# Patient Record
Sex: FEMALE | Race: BLACK OR AFRICAN AMERICAN | NOT HISPANIC OR LATINO | Employment: OTHER | RURAL
[De-identification: names, ages, dates, MRNs, and addresses within clinical notes are randomized per-mention and may not be internally consistent; named-entity substitution may affect disease eponyms.]

---

## 2020-05-07 ENCOUNTER — HISTORICAL (OUTPATIENT)
Dept: ADMINISTRATIVE | Facility: HOSPITAL | Age: 75
End: 2020-05-07

## 2020-05-07 LAB
BACTERIA #/AREA URNS HPF: ABNORMAL /HPF
BASOPHILS # BLD AUTO: 0.08 X10E3/UL (ref 0–0.2)
BASOPHILS NFR BLD AUTO: 0.9 % (ref 0–1)
BILIRUB UR QL STRIP: NEGATIVE MG/DL
BUN SERPL-MCNC: 16 MG/DL (ref 7–18)
CALCIUM SERPL-MCNC: 9.9 MG/DL (ref 8.5–10.1)
CHLORIDE SERPL-SCNC: 105 MMOL/L (ref 98–107)
CLARITY UR: ABNORMAL
CO2 SERPL-SCNC: 27 MMOL/L (ref 21–32)
COLOR UR: YELLOW
CREAT SERPL-MCNC: 0.85 MG/DL (ref 0.5–1.02)
EOSINOPHIL # BLD AUTO: 0.2 X10E3/UL (ref 0–0.5)
EOSINOPHIL NFR BLD AUTO: 2.3 % (ref 1–4)
ERYTHROCYTE [DISTWIDTH] IN BLOOD BY AUTOMATED COUNT: 13.8 % (ref 11.5–14.5)
GLUCOSE SERPL-MCNC: 103 MG/DL (ref 74–106)
GLUCOSE UR STRIP-MCNC: NEGATIVE MG/DL
HCT VFR BLD AUTO: 43.9 % (ref 38–47)
HGB BLD-MCNC: 13.5 G/DL (ref 12–16)
IMM GRANULOCYTES # BLD AUTO: 0.02 X10E3/UL (ref 0–0.04)
IMM GRANULOCYTES NFR BLD: 0.2 % (ref 0–0.4)
KETONES UR STRIP-SCNC: NEGATIVE MG/DL
LEUKOCYTE ESTERASE UR QL STRIP: ABNORMAL LEU/UL
LYMPHOCYTES # BLD AUTO: 2.63 X10E3/UL (ref 1–4.8)
LYMPHOCYTES NFR BLD AUTO: 30.5 % (ref 27–41)
MCH RBC QN AUTO: 28.8 PG (ref 27–31)
MCHC RBC AUTO-ENTMCNC: 30.8 G/DL (ref 32–36)
MCV RBC AUTO: 93.8 FL (ref 80–96)
MONOCYTES # BLD AUTO: 0.52 X10E3/UL (ref 0–0.8)
MONOCYTES NFR BLD AUTO: 6 % (ref 2–6)
MPC BLD CALC-MCNC: 11.1 FL (ref 9.4–12.4)
MUCOUS THREADS #/AREA URNS HPF: ABNORMAL /HPF
NEUTROPHILS # BLD AUTO: 5.16 X10E3/UL (ref 1.8–7.7)
NEUTROPHILS NFR BLD AUTO: 60.1 % (ref 53–65)
NITRITE UR QL STRIP: NEGATIVE
NRBC # BLD AUTO: 0 X10E3/UL (ref 0–0)
NRBC, AUTO (.00): 0 /100 (ref 0–0)
PH UR STRIP: 5.5 PH UNITS (ref 5–8)
PLATELET # BLD AUTO: 244 X10E3/UL (ref 150–400)
POTASSIUM SERPL-SCNC: 4.1 MMOL/L (ref 3.5–5.1)
PROT UR QL STRIP: NEGATIVE MG/DL
RBC # BLD AUTO: 4.68 X10E6/UL (ref 4.2–5.4)
RBC # UR STRIP: ABNORMAL ERY/UL
RBC #/AREA URNS HPF: ABNORMAL /HPF (ref 0–3)
SODIUM SERPL-SCNC: 139 MMOL/L (ref 136–145)
SP GR UR STRIP: 1.01 (ref 1–1.03)
SQUAMOUS #/AREA URNS LPF: ABNORMAL /LPF
TRICHOMONAS #/AREA URNS HPF: ABNORMAL /HPF
UROBILINOGEN UR STRIP-ACNC: 0.2 EU/DL
WBC # BLD AUTO: 8.61 X10E3/UL (ref 4.5–11)
WBC #/AREA URNS HPF: ABNORMAL /HPF (ref 0–5)
YEAST #/AREA URNS HPF: ABNORMAL /HPF

## 2020-05-09 LAB
REPORT: NORMAL

## 2020-05-15 ENCOUNTER — HISTORICAL (OUTPATIENT)
Dept: ADMINISTRATIVE | Facility: HOSPITAL | Age: 75
End: 2020-05-15

## 2020-05-19 ENCOUNTER — HISTORICAL (OUTPATIENT)
Dept: ADMINISTRATIVE | Facility: HOSPITAL | Age: 75
End: 2020-05-19

## 2020-05-19 LAB — CREAT SERPL-MCNC: 0.86 MG/DL (ref 0.5–1.02)

## 2020-05-22 ENCOUNTER — HISTORICAL (OUTPATIENT)
Dept: ADMINISTRATIVE | Facility: HOSPITAL | Age: 75
End: 2020-05-22

## 2020-06-17 ENCOUNTER — HISTORICAL (OUTPATIENT)
Dept: ADMINISTRATIVE | Facility: HOSPITAL | Age: 75
End: 2020-06-17

## 2020-06-17 LAB — CREAT SERPL-MCNC: 1.81 MG/DL (ref 0.5–1.02)

## 2021-03-19 ENCOUNTER — OFFICE VISIT (OUTPATIENT)
Dept: FAMILY MEDICINE | Facility: CLINIC | Age: 76
End: 2021-03-19
Payer: MEDICARE

## 2021-03-19 DIAGNOSIS — Z91.09 ENVIRONMENTAL ALLERGIES: ICD-10-CM

## 2021-03-19 DIAGNOSIS — E78.5 HYPERLIPIDEMIA, UNSPECIFIED HYPERLIPIDEMIA TYPE: Primary | ICD-10-CM

## 2021-03-19 DIAGNOSIS — K21.9 GASTROESOPHAGEAL REFLUX DISEASE, UNSPECIFIED WHETHER ESOPHAGITIS PRESENT: ICD-10-CM

## 2021-03-19 DIAGNOSIS — I63.9 CEREBROVASCULAR ACCIDENT (CVA), UNSPECIFIED MECHANISM: ICD-10-CM

## 2021-03-19 DIAGNOSIS — G25.81 RESTLESS LEG: ICD-10-CM

## 2021-03-19 DIAGNOSIS — F32.A DEPRESSION, UNSPECIFIED DEPRESSION TYPE: ICD-10-CM

## 2021-03-19 DIAGNOSIS — I10 HYPERTENSION, UNSPECIFIED TYPE: ICD-10-CM

## 2021-03-19 PROCEDURE — 99213 PR OFFICE/OUTPT VISIT, EST, LEVL III, 20-29 MIN: ICD-10-PCS | Mod: ,,, | Performed by: NURSE PRACTITIONER

## 2021-03-19 PROCEDURE — 99213 OFFICE O/P EST LOW 20 MIN: CPT | Mod: ,,, | Performed by: NURSE PRACTITIONER

## 2021-03-19 RX ORDER — AMLODIPINE BESYLATE 5 MG/1
5 TABLET ORAL DAILY
Qty: 90 TABLET | Refills: 0 | Status: SHIPPED | OUTPATIENT
Start: 2021-03-19 | End: 2021-06-23 | Stop reason: SDUPTHER

## 2021-03-19 RX ORDER — GABAPENTIN 100 MG/1
CAPSULE ORAL
COMMUNITY
Start: 2021-02-04 | End: 2021-03-19 | Stop reason: SDUPTHER

## 2021-03-19 RX ORDER — PANTOPRAZOLE SODIUM 40 MG/1
40 TABLET, DELAYED RELEASE ORAL DAILY
Qty: 90 TABLET | Refills: 0 | Status: SHIPPED | OUTPATIENT
Start: 2021-03-19 | End: 2021-06-23 | Stop reason: SDUPTHER

## 2021-03-19 RX ORDER — MONTELUKAST SODIUM 10 MG/1
10 TABLET ORAL DAILY
Qty: 90 TABLET | Refills: 0 | Status: SHIPPED | OUTPATIENT
Start: 2021-03-19 | End: 2021-06-23 | Stop reason: SDUPTHER

## 2021-03-19 RX ORDER — GABAPENTIN 100 MG/1
100 CAPSULE ORAL NIGHTLY
Qty: 90 CAPSULE | Refills: 0 | Status: SHIPPED | OUTPATIENT
Start: 2021-03-19 | End: 2021-06-23 | Stop reason: SDUPTHER

## 2021-03-19 RX ORDER — ROSUVASTATIN CALCIUM 40 MG/1
10 TABLET, COATED ORAL NIGHTLY
COMMUNITY
End: 2021-03-19 | Stop reason: SDUPTHER

## 2021-03-19 RX ORDER — BUSPIRONE HYDROCHLORIDE 15 MG/1
15 TABLET ORAL DAILY
COMMUNITY
Start: 2021-03-10 | End: 2021-03-19 | Stop reason: SDUPTHER

## 2021-03-19 RX ORDER — ESCITALOPRAM OXALATE 20 MG/1
20 TABLET ORAL DAILY
COMMUNITY
Start: 2021-01-06 | End: 2021-03-19 | Stop reason: SDUPTHER

## 2021-03-19 RX ORDER — METOPROLOL TARTRATE 25 MG/1
12.5 TABLET, FILM COATED ORAL 2 TIMES DAILY
COMMUNITY
Start: 2021-02-04 | End: 2021-03-19 | Stop reason: SDUPTHER

## 2021-03-19 RX ORDER — ROSUVASTATIN CALCIUM 40 MG/1
40 TABLET, COATED ORAL NIGHTLY
Qty: 90 TABLET | Refills: 0 | Status: SHIPPED | OUTPATIENT
Start: 2021-03-19 | End: 2021-06-23 | Stop reason: SDUPTHER

## 2021-03-19 RX ORDER — LISINOPRIL 40 MG/1
40 TABLET ORAL DAILY
Qty: 90 TABLET | Refills: 0 | Status: SHIPPED | OUTPATIENT
Start: 2021-03-19 | End: 2021-06-23 | Stop reason: SDUPTHER

## 2021-03-19 RX ORDER — MONTELUKAST SODIUM 10 MG/1
10 TABLET ORAL DAILY
COMMUNITY
Start: 2021-01-06 | End: 2021-03-19 | Stop reason: SDUPTHER

## 2021-03-19 RX ORDER — PANTOPRAZOLE SODIUM 40 MG/1
TABLET, DELAYED RELEASE ORAL
COMMUNITY
Start: 2021-02-04 | End: 2021-03-19 | Stop reason: SDUPTHER

## 2021-03-19 RX ORDER — ROPINIROLE 0.25 MG/1
0.25 TABLET, FILM COATED ORAL 3 TIMES DAILY
Qty: 180 TABLET | Refills: 0 | Status: SHIPPED | OUTPATIENT
Start: 2021-03-19 | End: 2021-06-23 | Stop reason: SDUPTHER

## 2021-03-19 RX ORDER — FLUTICASONE FUROATE, UMECLIDINIUM BROMIDE AND VILANTEROL TRIFENATATE 100; 62.5; 25 UG/1; UG/1; UG/1
POWDER RESPIRATORY (INHALATION)
COMMUNITY
Start: 2021-03-18 | End: 2021-08-02 | Stop reason: SDUPTHER

## 2021-03-19 RX ORDER — ROPINIROLE 0.25 MG/1
0.25 TABLET, FILM COATED ORAL 3 TIMES DAILY
COMMUNITY
End: 2021-03-19 | Stop reason: SDUPTHER

## 2021-03-19 RX ORDER — METOPROLOL TARTRATE 25 MG/1
12.5 TABLET, FILM COATED ORAL 2 TIMES DAILY
Qty: 90 TABLET | Refills: 0 | Status: SHIPPED | OUTPATIENT
Start: 2021-03-19 | End: 2021-06-23 | Stop reason: SDUPTHER

## 2021-03-19 RX ORDER — CLOPIDOGREL BISULFATE 75 MG/1
75 TABLET ORAL ONCE
Qty: 90 TABLET | Refills: 0 | Status: SHIPPED | OUTPATIENT
Start: 2021-03-19 | End: 2021-06-23 | Stop reason: SDUPTHER

## 2021-03-19 RX ORDER — BUSPIRONE HYDROCHLORIDE 15 MG/1
15 TABLET ORAL DAILY
Qty: 90 TABLET | Refills: 0 | Status: SHIPPED | OUTPATIENT
Start: 2021-03-19 | End: 2021-06-23 | Stop reason: SDUPTHER

## 2021-03-19 RX ORDER — ESCITALOPRAM OXALATE 20 MG/1
20 TABLET ORAL DAILY
Qty: 90 TABLET | Refills: 0 | Status: SHIPPED | OUTPATIENT
Start: 2021-03-19 | End: 2021-06-23 | Stop reason: SDUPTHER

## 2021-03-19 RX ORDER — LISINOPRIL 40 MG/1
40 TABLET ORAL DAILY
COMMUNITY
Start: 2021-02-04 | End: 2021-03-19 | Stop reason: SDUPTHER

## 2021-03-19 RX ORDER — CLOPIDOGREL 300 MG/1
300 TABLET, FILM COATED ORAL ONCE
COMMUNITY
End: 2021-03-19 | Stop reason: SDUPTHER

## 2021-03-19 RX ORDER — ISOSORBIDE MONONITRATE 30 MG/1
30 TABLET, EXTENDED RELEASE ORAL DAILY
Qty: 90 TABLET | Refills: 0 | Status: SHIPPED | OUTPATIENT
Start: 2021-03-19 | End: 2021-06-23 | Stop reason: SDUPTHER

## 2021-03-19 RX ORDER — AMLODIPINE BESYLATE 5 MG/1
5 TABLET ORAL DAILY
COMMUNITY
Start: 2021-01-20 | End: 2021-03-19 | Stop reason: SDUPTHER

## 2021-03-19 RX ORDER — ISOSORBIDE MONONITRATE 30 MG/1
30 TABLET, EXTENDED RELEASE ORAL DAILY
COMMUNITY
Start: 2021-02-04 | End: 2021-03-19 | Stop reason: SDUPTHER

## 2021-05-24 ENCOUNTER — TELEPHONE (OUTPATIENT)
Dept: FAMILY MEDICINE | Facility: CLINIC | Age: 76
End: 2021-05-24

## 2021-06-21 DIAGNOSIS — Z91.09 ENVIRONMENTAL ALLERGIES: ICD-10-CM

## 2021-06-21 DIAGNOSIS — E78.5 HYPERLIPIDEMIA, UNSPECIFIED HYPERLIPIDEMIA TYPE: ICD-10-CM

## 2021-06-21 DIAGNOSIS — I10 HYPERTENSION, UNSPECIFIED TYPE: ICD-10-CM

## 2021-06-21 DIAGNOSIS — K21.9 GASTROESOPHAGEAL REFLUX DISEASE, UNSPECIFIED WHETHER ESOPHAGITIS PRESENT: ICD-10-CM

## 2021-06-21 DIAGNOSIS — G25.81 RESTLESS LEG: ICD-10-CM

## 2021-06-21 DIAGNOSIS — F32.A DEPRESSION, UNSPECIFIED DEPRESSION TYPE: ICD-10-CM

## 2021-06-21 DIAGNOSIS — I63.9 CEREBROVASCULAR ACCIDENT (CVA), UNSPECIFIED MECHANISM: ICD-10-CM

## 2021-06-23 RX ORDER — PANTOPRAZOLE SODIUM 40 MG/1
40 TABLET, DELAYED RELEASE ORAL DAILY
Qty: 90 TABLET | Refills: 0 | Status: SHIPPED | OUTPATIENT
Start: 2021-06-23 | End: 2021-08-02 | Stop reason: SDUPTHER

## 2021-06-23 RX ORDER — ROSUVASTATIN CALCIUM 40 MG/1
40 TABLET, COATED ORAL NIGHTLY
Qty: 90 TABLET | Refills: 0 | Status: SHIPPED | OUTPATIENT
Start: 2021-06-23 | End: 2021-08-02 | Stop reason: SDUPTHER

## 2021-06-23 RX ORDER — ESCITALOPRAM OXALATE 20 MG/1
20 TABLET ORAL DAILY
Qty: 90 TABLET | Refills: 0 | Status: SHIPPED | OUTPATIENT
Start: 2021-06-23 | End: 2021-08-02 | Stop reason: SDUPTHER

## 2021-06-23 RX ORDER — AMLODIPINE BESYLATE 5 MG/1
5 TABLET ORAL DAILY
Qty: 90 TABLET | Refills: 0 | Status: SHIPPED | OUTPATIENT
Start: 2021-06-23 | End: 2021-08-02 | Stop reason: SDUPTHER

## 2021-06-23 RX ORDER — ROPINIROLE 0.25 MG/1
0.25 TABLET, FILM COATED ORAL 3 TIMES DAILY
Qty: 180 TABLET | Refills: 0 | Status: SHIPPED | OUTPATIENT
Start: 2021-06-23 | End: 2021-08-02 | Stop reason: SDUPTHER

## 2021-06-23 RX ORDER — METOPROLOL TARTRATE 25 MG/1
12.5 TABLET, FILM COATED ORAL 2 TIMES DAILY
Qty: 90 TABLET | Refills: 0 | Status: SHIPPED | OUTPATIENT
Start: 2021-06-23 | End: 2021-08-02 | Stop reason: SDUPTHER

## 2021-06-23 RX ORDER — GABAPENTIN 100 MG/1
100 CAPSULE ORAL NIGHTLY
Qty: 90 CAPSULE | Refills: 0 | Status: SHIPPED | OUTPATIENT
Start: 2021-06-23 | End: 2021-08-02 | Stop reason: SDUPTHER

## 2021-06-23 RX ORDER — LISINOPRIL 40 MG/1
40 TABLET ORAL DAILY
Qty: 90 TABLET | Refills: 0 | Status: SHIPPED | OUTPATIENT
Start: 2021-06-23 | End: 2021-08-02 | Stop reason: SDUPTHER

## 2021-06-23 RX ORDER — BUSPIRONE HYDROCHLORIDE 15 MG/1
15 TABLET ORAL DAILY
Qty: 90 TABLET | Refills: 0 | Status: SHIPPED | OUTPATIENT
Start: 2021-06-23 | End: 2021-08-02 | Stop reason: SDUPTHER

## 2021-06-23 RX ORDER — CLOPIDOGREL BISULFATE 75 MG/1
75 TABLET ORAL ONCE
Qty: 90 TABLET | Refills: 0 | Status: SHIPPED | OUTPATIENT
Start: 2021-06-23 | End: 2021-08-02 | Stop reason: SDUPTHER

## 2021-06-23 RX ORDER — ISOSORBIDE MONONITRATE 30 MG/1
30 TABLET, EXTENDED RELEASE ORAL DAILY
Qty: 90 TABLET | Refills: 0 | Status: SHIPPED | OUTPATIENT
Start: 2021-06-23 | End: 2021-08-02 | Stop reason: SDUPTHER

## 2021-06-23 RX ORDER — MONTELUKAST SODIUM 10 MG/1
10 TABLET ORAL DAILY
Qty: 90 TABLET | Refills: 0 | Status: SHIPPED | OUTPATIENT
Start: 2021-06-23 | End: 2021-08-02 | Stop reason: SDUPTHER

## 2021-08-02 ENCOUNTER — OFFICE VISIT (OUTPATIENT)
Dept: FAMILY MEDICINE | Facility: CLINIC | Age: 76
End: 2021-08-02
Payer: MEDICARE

## 2021-08-02 VITALS
SYSTOLIC BLOOD PRESSURE: 148 MMHG | RESPIRATION RATE: 17 BRPM | DIASTOLIC BLOOD PRESSURE: 90 MMHG | HEART RATE: 75 BPM | HEIGHT: 67 IN | BODY MASS INDEX: 39.24 KG/M2 | OXYGEN SATURATION: 98 % | TEMPERATURE: 99 F | WEIGHT: 250 LBS

## 2021-08-02 DIAGNOSIS — I63.9 CEREBROVASCULAR ACCIDENT (CVA), UNSPECIFIED MECHANISM: ICD-10-CM

## 2021-08-02 DIAGNOSIS — F41.9 ANXIETY: Primary | ICD-10-CM

## 2021-08-02 DIAGNOSIS — E78.5 HYPERLIPIDEMIA, UNSPECIFIED HYPERLIPIDEMIA TYPE: ICD-10-CM

## 2021-08-02 DIAGNOSIS — F32.A DEPRESSION, UNSPECIFIED DEPRESSION TYPE: ICD-10-CM

## 2021-08-02 DIAGNOSIS — F40.00 AGORAPHOBIA: ICD-10-CM

## 2021-08-02 DIAGNOSIS — G25.81 RESTLESS LEG: ICD-10-CM

## 2021-08-02 DIAGNOSIS — K21.9 GASTROESOPHAGEAL REFLUX DISEASE, UNSPECIFIED WHETHER ESOPHAGITIS PRESENT: ICD-10-CM

## 2021-08-02 DIAGNOSIS — Z91.09 ENVIRONMENTAL ALLERGIES: ICD-10-CM

## 2021-08-02 DIAGNOSIS — I10 HYPERTENSION, UNSPECIFIED TYPE: ICD-10-CM

## 2021-08-02 PROCEDURE — 99214 PR OFFICE/OUTPT VISIT, EST, LEVL IV, 30-39 MIN: ICD-10-PCS | Mod: ,,, | Performed by: NURSE PRACTITIONER

## 2021-08-02 PROCEDURE — 99214 OFFICE O/P EST MOD 30 MIN: CPT | Mod: ,,, | Performed by: NURSE PRACTITIONER

## 2021-08-02 RX ORDER — PANTOPRAZOLE SODIUM 40 MG/1
40 TABLET, DELAYED RELEASE ORAL DAILY
Qty: 90 TABLET | Refills: 1 | Status: SHIPPED | OUTPATIENT
Start: 2021-08-02 | End: 2022-06-13 | Stop reason: SDUPTHER

## 2021-08-02 RX ORDER — FLUTICASONE FUROATE, UMECLIDINIUM BROMIDE AND VILANTEROL TRIFENATATE 100; 62.5; 25 UG/1; UG/1; UG/1
1 POWDER RESPIRATORY (INHALATION) DAILY
Qty: 60 EACH | Refills: 5 | Status: SHIPPED | OUTPATIENT
Start: 2021-08-02 | End: 2022-06-13 | Stop reason: SDUPTHER

## 2021-08-02 RX ORDER — ISOSORBIDE MONONITRATE 30 MG/1
30 TABLET, EXTENDED RELEASE ORAL DAILY
Qty: 90 TABLET | Refills: 1 | Status: SHIPPED | OUTPATIENT
Start: 2021-08-02 | End: 2022-06-13 | Stop reason: SDUPTHER

## 2021-08-02 RX ORDER — METOPROLOL TARTRATE 25 MG/1
12.5 TABLET, FILM COATED ORAL 2 TIMES DAILY
Qty: 90 TABLET | Refills: 1 | Status: SHIPPED | OUTPATIENT
Start: 2021-08-02 | End: 2022-06-13 | Stop reason: SDUPTHER

## 2021-08-02 RX ORDER — GABAPENTIN 100 MG/1
100 CAPSULE ORAL NIGHTLY
Qty: 90 CAPSULE | Refills: 1 | Status: SHIPPED | OUTPATIENT
Start: 2021-08-02 | End: 2022-06-13 | Stop reason: SDUPTHER

## 2021-08-02 RX ORDER — ROSUVASTATIN CALCIUM 40 MG/1
40 TABLET, COATED ORAL NIGHTLY
Qty: 90 TABLET | Refills: 1 | Status: SHIPPED | OUTPATIENT
Start: 2021-08-02 | End: 2022-06-13 | Stop reason: SDUPTHER

## 2021-08-02 RX ORDER — CLOPIDOGREL BISULFATE 75 MG/1
75 TABLET ORAL ONCE
Qty: 90 TABLET | Refills: 1 | Status: SHIPPED | OUTPATIENT
Start: 2021-08-02 | End: 2022-06-13 | Stop reason: SDUPTHER

## 2021-08-02 RX ORDER — BUSPIRONE HYDROCHLORIDE 15 MG/1
15 TABLET ORAL DAILY
Qty: 90 TABLET | Refills: 1 | Status: SHIPPED | OUTPATIENT
Start: 2021-08-02 | End: 2022-06-13

## 2021-08-02 RX ORDER — ESCITALOPRAM OXALATE 20 MG/1
20 TABLET ORAL DAILY
Qty: 90 TABLET | Refills: 1 | Status: SHIPPED | OUTPATIENT
Start: 2021-08-02 | End: 2022-06-13 | Stop reason: SDUPTHER

## 2021-08-02 RX ORDER — MONTELUKAST SODIUM 10 MG/1
10 TABLET ORAL DAILY
Qty: 90 TABLET | Refills: 1 | Status: SHIPPED | OUTPATIENT
Start: 2021-08-02 | End: 2022-06-13 | Stop reason: SDUPTHER

## 2021-08-02 RX ORDER — ROPINIROLE 0.25 MG/1
0.25 TABLET, FILM COATED ORAL 3 TIMES DAILY
Qty: 180 TABLET | Refills: 1 | Status: SHIPPED | OUTPATIENT
Start: 2021-08-02 | End: 2022-06-13 | Stop reason: SDUPTHER

## 2021-08-02 RX ORDER — AMLODIPINE BESYLATE 5 MG/1
5 TABLET ORAL DAILY
Qty: 90 TABLET | Refills: 1 | Status: SHIPPED | OUTPATIENT
Start: 2021-08-02 | End: 2022-06-13 | Stop reason: SDUPTHER

## 2021-08-02 RX ORDER — LISINOPRIL 40 MG/1
40 TABLET ORAL DAILY
Qty: 90 TABLET | Refills: 1 | Status: SHIPPED | OUTPATIENT
Start: 2021-08-02 | End: 2022-06-13 | Stop reason: SDUPTHER

## 2022-03-11 DIAGNOSIS — Z71.89 COMPLEX CARE COORDINATION: ICD-10-CM

## 2022-06-13 ENCOUNTER — OFFICE VISIT (OUTPATIENT)
Dept: FAMILY MEDICINE | Facility: CLINIC | Age: 77
End: 2022-06-13
Payer: MEDICARE

## 2022-06-13 VITALS
TEMPERATURE: 98 F | RESPIRATION RATE: 17 BRPM | BODY MASS INDEX: 37.83 KG/M2 | SYSTOLIC BLOOD PRESSURE: 136 MMHG | HEIGHT: 67 IN | HEART RATE: 86 BPM | WEIGHT: 241 LBS | DIASTOLIC BLOOD PRESSURE: 82 MMHG | OXYGEN SATURATION: 97 %

## 2022-06-13 DIAGNOSIS — Z13.220 SCREENING FOR LIPOID DISORDERS: ICD-10-CM

## 2022-06-13 DIAGNOSIS — E78.5 HYPERLIPIDEMIA, UNSPECIFIED HYPERLIPIDEMIA TYPE: ICD-10-CM

## 2022-06-13 DIAGNOSIS — K21.9 GASTROESOPHAGEAL REFLUX DISEASE, UNSPECIFIED WHETHER ESOPHAGITIS PRESENT: ICD-10-CM

## 2022-06-13 DIAGNOSIS — G25.81 RESTLESS LEG: ICD-10-CM

## 2022-06-13 DIAGNOSIS — Z91.09 ENVIRONMENTAL ALLERGIES: ICD-10-CM

## 2022-06-13 DIAGNOSIS — F41.9 ANXIETY: ICD-10-CM

## 2022-06-13 DIAGNOSIS — I49.9 IRREGULAR HEARTBEAT: ICD-10-CM

## 2022-06-13 DIAGNOSIS — I63.9 CEREBROVASCULAR ACCIDENT (CVA), UNSPECIFIED MECHANISM: ICD-10-CM

## 2022-06-13 DIAGNOSIS — R94.31 ABNORMAL EKG: ICD-10-CM

## 2022-06-13 DIAGNOSIS — I10 HYPERTENSION, UNSPECIFIED TYPE: Primary | ICD-10-CM

## 2022-06-13 DIAGNOSIS — F32.A DEPRESSION, UNSPECIFIED DEPRESSION TYPE: ICD-10-CM

## 2022-06-13 LAB
ALBUMIN SERPL BCP-MCNC: 3.4 G/DL (ref 3.5–5)
ALBUMIN/GLOB SERPL: 0.9 {RATIO}
ALP SERPL-CCNC: 122 U/L (ref 55–142)
ALT SERPL W P-5'-P-CCNC: 18 U/L (ref 13–56)
ANION GAP SERPL CALCULATED.3IONS-SCNC: 11 MMOL/L (ref 7–16)
AST SERPL W P-5'-P-CCNC: 18 U/L (ref 15–37)
BASOPHILS # BLD AUTO: 0.09 K/UL (ref 0–0.2)
BASOPHILS NFR BLD AUTO: 1.1 % (ref 0–1)
BILIRUB SERPL-MCNC: 0.3 MG/DL (ref 0–1.2)
BUN SERPL-MCNC: 13 MG/DL (ref 7–18)
BUN/CREAT SERPL: 11 (ref 6–20)
CALCIUM SERPL-MCNC: 9.8 MG/DL (ref 8.5–10.1)
CHLORIDE SERPL-SCNC: 109 MMOL/L (ref 98–107)
CHOLEST SERPL-MCNC: 238 MG/DL (ref 0–200)
CHOLEST/HDLC SERPL: 5.2 {RATIO}
CO2 SERPL-SCNC: 28 MMOL/L (ref 21–32)
CREAT SERPL-MCNC: 1.19 MG/DL (ref 0.55–1.02)
DIFFERENTIAL METHOD BLD: ABNORMAL
EOSINOPHIL # BLD AUTO: 0.11 K/UL (ref 0–0.5)
EOSINOPHIL NFR BLD AUTO: 1.4 % (ref 1–4)
ERYTHROCYTE [DISTWIDTH] IN BLOOD BY AUTOMATED COUNT: 15 % (ref 11.5–14.5)
GLOBULIN SER-MCNC: 3.6 G/DL (ref 2–4)
GLUCOSE SERPL-MCNC: 94 MG/DL (ref 74–106)
HCT VFR BLD AUTO: 47.2 % (ref 38–47)
HDLC SERPL-MCNC: 46 MG/DL (ref 40–60)
HGB BLD-MCNC: 15 G/DL (ref 12–16)
IMM GRANULOCYTES # BLD AUTO: 0.01 K/UL (ref 0–0.04)
IMM GRANULOCYTES NFR BLD: 0.1 % (ref 0–0.4)
LDLC SERPL CALC-MCNC: 141 MG/DL
LDLC/HDLC SERPL: 3.1 {RATIO}
LYMPHOCYTES # BLD AUTO: 2.16 K/UL (ref 1–4.8)
LYMPHOCYTES NFR BLD AUTO: 27.1 % (ref 27–41)
MCH RBC QN AUTO: 29.8 PG (ref 27–31)
MCHC RBC AUTO-ENTMCNC: 31.8 G/DL (ref 32–36)
MCV RBC AUTO: 93.8 FL (ref 80–96)
MONOCYTES # BLD AUTO: 0.63 K/UL (ref 0–0.8)
MONOCYTES NFR BLD AUTO: 7.9 % (ref 2–6)
MPC BLD CALC-MCNC: 11.9 FL (ref 9.4–12.4)
NEUTROPHILS # BLD AUTO: 4.97 K/UL (ref 1.8–7.7)
NEUTROPHILS NFR BLD AUTO: 62.4 % (ref 53–65)
NONHDLC SERPL-MCNC: 192 MG/DL
NRBC # BLD AUTO: 0 X10E3/UL
NRBC, AUTO (.00): 0 %
PLATELET # BLD AUTO: 213 K/UL (ref 150–400)
POTASSIUM SERPL-SCNC: 3.9 MMOL/L (ref 3.5–5.1)
PROT SERPL-MCNC: 7 G/DL (ref 6.4–8.2)
RBC # BLD AUTO: 5.03 M/UL (ref 4.2–5.4)
SODIUM SERPL-SCNC: 144 MMOL/L (ref 136–145)
TRIGL SERPL-MCNC: 256 MG/DL (ref 35–150)
TSH SERPL DL<=0.005 MIU/L-ACNC: 1.88 UIU/ML (ref 0.36–3.74)
VLDLC SERPL-MCNC: 51 MG/DL
WBC # BLD AUTO: 7.97 K/UL (ref 4.5–11)

## 2022-06-13 PROCEDURE — 80061 LIPID PANEL: CPT | Mod: ,,, | Performed by: CLINICAL MEDICAL LABORATORY

## 2022-06-13 PROCEDURE — 85025 COMPLETE CBC W/AUTO DIFF WBC: CPT | Mod: ,,, | Performed by: CLINICAL MEDICAL LABORATORY

## 2022-06-13 PROCEDURE — 80053 COMPREHENSIVE METABOLIC PANEL: ICD-10-PCS | Mod: ,,, | Performed by: CLINICAL MEDICAL LABORATORY

## 2022-06-13 PROCEDURE — 85025 CBC WITH DIFFERENTIAL: ICD-10-PCS | Mod: ,,, | Performed by: CLINICAL MEDICAL LABORATORY

## 2022-06-13 PROCEDURE — 93010 PR ELECTROCARDIOGRAM REPORT: ICD-10-PCS | Mod: ,,, | Performed by: NURSE PRACTITIONER

## 2022-06-13 PROCEDURE — 99213 PR OFFICE/OUTPT VISIT, EST, LEVL III, 20-29 MIN: ICD-10-PCS | Mod: ,,, | Performed by: NURSE PRACTITIONER

## 2022-06-13 PROCEDURE — 80061 LIPID PANEL: ICD-10-PCS | Mod: ,,, | Performed by: CLINICAL MEDICAL LABORATORY

## 2022-06-13 PROCEDURE — 84443 TSH: ICD-10-PCS | Mod: ,,, | Performed by: CLINICAL MEDICAL LABORATORY

## 2022-06-13 PROCEDURE — 80053 COMPREHEN METABOLIC PANEL: CPT | Mod: ,,, | Performed by: CLINICAL MEDICAL LABORATORY

## 2022-06-13 PROCEDURE — 99213 OFFICE O/P EST LOW 20 MIN: CPT | Mod: ,,, | Performed by: NURSE PRACTITIONER

## 2022-06-13 PROCEDURE — 84443 ASSAY THYROID STIM HORMONE: CPT | Mod: ,,, | Performed by: CLINICAL MEDICAL LABORATORY

## 2022-06-13 PROCEDURE — 93010 ELECTROCARDIOGRAM REPORT: CPT | Mod: ,,, | Performed by: NURSE PRACTITIONER

## 2022-06-13 PROCEDURE — 93005 ELECTROCARDIOGRAM TRACING: CPT | Mod: RHCUB | Performed by: NURSE PRACTITIONER

## 2022-06-13 RX ORDER — MONTELUKAST SODIUM 10 MG/1
10 TABLET ORAL DAILY
Qty: 90 TABLET | Refills: 1 | Status: SHIPPED | OUTPATIENT
Start: 2022-06-13 | End: 2023-01-04 | Stop reason: SDUPTHER

## 2022-06-13 RX ORDER — METOPROLOL TARTRATE 25 MG/1
12.5 TABLET, FILM COATED ORAL 2 TIMES DAILY
Qty: 180 TABLET | Refills: 1 | Status: SHIPPED | OUTPATIENT
Start: 2022-06-13 | End: 2023-01-04 | Stop reason: SDUPTHER

## 2022-06-13 RX ORDER — FLUTICASONE FUROATE, UMECLIDINIUM BROMIDE AND VILANTEROL TRIFENATATE 100; 62.5; 25 UG/1; UG/1; UG/1
1 POWDER RESPIRATORY (INHALATION) DAILY
Qty: 60 EACH | Refills: 5 | Status: SHIPPED | OUTPATIENT
Start: 2022-06-13 | End: 2023-01-04 | Stop reason: SDUPTHER

## 2022-06-13 RX ORDER — AMLODIPINE BESYLATE 5 MG/1
5 TABLET ORAL DAILY
Qty: 90 TABLET | Refills: 1 | Status: SHIPPED | OUTPATIENT
Start: 2022-06-13 | End: 2023-01-04 | Stop reason: SDUPTHER

## 2022-06-13 RX ORDER — BUSPIRONE HYDROCHLORIDE 10 MG/1
10 TABLET ORAL 3 TIMES DAILY
Qty: 90 TABLET | Refills: 5 | Status: SHIPPED | OUTPATIENT
Start: 2022-06-13 | End: 2023-01-04 | Stop reason: SDUPTHER

## 2022-06-13 RX ORDER — CLOPIDOGREL BISULFATE 75 MG/1
75 TABLET ORAL ONCE
Qty: 90 TABLET | Refills: 1 | Status: SHIPPED | OUTPATIENT
Start: 2022-06-13 | End: 2023-01-04 | Stop reason: SDUPTHER

## 2022-06-13 RX ORDER — LISINOPRIL 40 MG/1
40 TABLET ORAL DAILY
Qty: 90 TABLET | Refills: 1 | Status: SHIPPED | OUTPATIENT
Start: 2022-06-13 | End: 2023-01-04 | Stop reason: SDUPTHER

## 2022-06-13 RX ORDER — GABAPENTIN 100 MG/1
100 CAPSULE ORAL NIGHTLY
Qty: 90 CAPSULE | Refills: 1 | Status: SHIPPED | OUTPATIENT
Start: 2022-06-13 | End: 2023-01-04 | Stop reason: SDUPTHER

## 2022-06-13 RX ORDER — PANTOPRAZOLE SODIUM 40 MG/1
40 TABLET, DELAYED RELEASE ORAL DAILY
Qty: 90 TABLET | Refills: 1 | Status: SHIPPED | OUTPATIENT
Start: 2022-06-13 | End: 2023-01-04 | Stop reason: SDUPTHER

## 2022-06-13 RX ORDER — ROPINIROLE 0.25 MG/1
0.25 TABLET, FILM COATED ORAL 3 TIMES DAILY
Qty: 180 TABLET | Refills: 1 | Status: SHIPPED | OUTPATIENT
Start: 2022-06-13 | End: 2023-01-04 | Stop reason: SDUPTHER

## 2022-06-13 RX ORDER — NAPROXEN SODIUM 220 MG/1
81 TABLET, FILM COATED ORAL DAILY
Qty: 90 TABLET | Refills: 1 | Status: SHIPPED | OUTPATIENT
Start: 2022-06-13 | End: 2023-12-27

## 2022-06-13 RX ORDER — ISOSORBIDE MONONITRATE 30 MG/1
30 TABLET, EXTENDED RELEASE ORAL DAILY
Qty: 90 TABLET | Refills: 1 | Status: SHIPPED | OUTPATIENT
Start: 2022-06-13 | End: 2023-01-04 | Stop reason: SDUPTHER

## 2022-06-13 RX ORDER — BUSPIRONE HYDROCHLORIDE 15 MG/1
15 TABLET ORAL DAILY
Qty: 90 TABLET | Refills: 1 | Status: CANCELLED | OUTPATIENT
Start: 2022-06-13

## 2022-06-13 RX ORDER — ESCITALOPRAM OXALATE 20 MG/1
20 TABLET ORAL DAILY
Qty: 90 TABLET | Refills: 1 | Status: SHIPPED | OUTPATIENT
Start: 2022-06-13 | End: 2023-01-04 | Stop reason: SDUPTHER

## 2022-06-13 RX ORDER — ROSUVASTATIN CALCIUM 40 MG/1
40 TABLET, COATED ORAL NIGHTLY
Qty: 90 TABLET | Refills: 1 | Status: SHIPPED | OUTPATIENT
Start: 2022-06-13 | End: 2023-01-04 | Stop reason: SDUPTHER

## 2022-06-13 NOTE — PROGRESS NOTES
Subjective:       Patient ID: Livier Romero is a 77 y.o. female.    Chief Complaint: Medication Refill (Refill on all medications)    Ms. Romero presents to clinic in follow up, her last appointment was 8/2021, last labs 2020. She is nonfasting for labs today and reports she has been out of most medications for approximately one month. She complains of increased anxiety attacks daily, sometimes 2-3 a day. She has been out of lexapro for 3 weeks, she notes anxiety attacks started approximately 3 wks ago. The patient has home oxygen but reports she is unable to leave home due to not having portable oxygen, she reports dyspnea on exertion and notes that she frequently uses home oxygen. Oxygen is 91% at rest, and drops to 88% while ambulating. Once 2L oxygen applied, sats increase to 95%.    Review of Systems   Constitutional: Negative.    Respiratory: Positive for shortness of breath (with exertion). Negative for cough and chest tightness.    Cardiovascular: Negative for chest pain, palpitations and leg swelling.   Gastrointestinal: Negative.    Genitourinary: Negative.    Psychiatric/Behavioral: Positive for dysphoric mood. The patient is nervous/anxious.          Objective:      Physical Exam  Vitals and nursing note reviewed.   Constitutional:       Appearance: Normal appearance. She is obese.   HENT:      Head: Normocephalic.   Eyes:      Pupils: Pupils are equal, round, and reactive to light.   Cardiovascular:      Rate and Rhythm: Normal rate. Rhythm irregular.      Pulses: Normal pulses.      Heart sounds: Normal heart sounds.   Pulmonary:      Effort: Pulmonary effort is normal.      Breath sounds: Normal breath sounds.   Abdominal:      General: Bowel sounds are normal.      Palpations: Abdomen is soft.   Skin:     General: Skin is warm and dry.   Neurological:      Mental Status: She is alert and oriented to person, place, and time.   Psychiatric:         Behavior: Behavior normal.         Assessment:        Problem List Items Addressed This Visit    None     Visit Diagnoses     Hypertension, unspecified type        Depression, unspecified depression type        Cerebrovascular accident (CVA), unspecified mechanism        Restless leg        Environmental allergies        Gastroesophageal reflux disease, unspecified whether esophagitis present        Hyperlipidemia, unspecified hyperlipidemia type              Plan:        EKG with frequent PVCs - she has been out of medications for at least one month, increase metoprolol to 25mg BID. Cardiology appointment for evaluation, known to Dr. Lazar at [facility]. Labs today, does not drink caffeine.   Anxiety - Restart lexapro, may take buspar 10mg up to TID as needed.   Labs today, schedule follow up in 6 months, sooner if needed.   Home Health - medication administration, anxiety, hypertension, H/O CVA; the patient is home bound

## 2022-06-14 NOTE — PROGRESS NOTES
Pt's O2 SATs observed are as follows:    91% while at rest,   88% while walking w/o O2   95% walking with O2

## 2022-10-09 DIAGNOSIS — Z71.89 COMPLEX CARE COORDINATION: ICD-10-CM

## 2023-01-04 ENCOUNTER — OFFICE VISIT (OUTPATIENT)
Dept: FAMILY MEDICINE | Facility: CLINIC | Age: 78
End: 2023-01-04
Payer: MEDICARE

## 2023-01-04 VITALS
RESPIRATION RATE: 18 BRPM | BODY MASS INDEX: 35.31 KG/M2 | DIASTOLIC BLOOD PRESSURE: 88 MMHG | HEART RATE: 81 BPM | TEMPERATURE: 97 F | OXYGEN SATURATION: 97 % | WEIGHT: 225 LBS | SYSTOLIC BLOOD PRESSURE: 134 MMHG | HEIGHT: 67 IN

## 2023-01-04 DIAGNOSIS — F33.1 MODERATE EPISODE OF RECURRENT MAJOR DEPRESSIVE DISORDER: ICD-10-CM

## 2023-01-04 DIAGNOSIS — E78.2 MIXED HYPERLIPIDEMIA: ICD-10-CM

## 2023-01-04 DIAGNOSIS — I63.89 CEREBROVASCULAR ACCIDENT (CVA) DUE TO OTHER MECHANISM: ICD-10-CM

## 2023-01-04 DIAGNOSIS — K21.9 GASTROESOPHAGEAL REFLUX DISEASE WITHOUT ESOPHAGITIS: ICD-10-CM

## 2023-01-04 DIAGNOSIS — I10 PRIMARY HYPERTENSION: Primary | ICD-10-CM

## 2023-01-04 DIAGNOSIS — Z91.09 ENVIRONMENTAL ALLERGIES: ICD-10-CM

## 2023-01-04 DIAGNOSIS — G25.81 RESTLESS LEG: ICD-10-CM

## 2023-01-04 LAB
ALBUMIN SERPL BCP-MCNC: 3.7 G/DL (ref 3.5–5)
ALBUMIN/GLOB SERPL: 0.9 {RATIO}
ALP SERPL-CCNC: 139 U/L (ref 55–142)
ALT SERPL W P-5'-P-CCNC: 17 U/L (ref 13–56)
ANION GAP SERPL CALCULATED.3IONS-SCNC: 9 MMOL/L (ref 7–16)
AST SERPL W P-5'-P-CCNC: 14 U/L (ref 15–37)
BASOPHILS # BLD AUTO: 0.12 K/UL (ref 0–0.2)
BASOPHILS NFR BLD AUTO: 1.3 % (ref 0–1)
BILIRUB SERPL-MCNC: 0.3 MG/DL (ref ?–1.2)
BUN SERPL-MCNC: 21 MG/DL (ref 7–18)
BUN/CREAT SERPL: 17 (ref 6–20)
CALCIUM SERPL-MCNC: 10 MG/DL (ref 8.5–10.1)
CHLORIDE SERPL-SCNC: 106 MMOL/L (ref 98–107)
CHOLEST SERPL-MCNC: 211 MG/DL (ref 0–200)
CHOLEST/HDLC SERPL: 4.6 {RATIO}
CO2 SERPL-SCNC: 29 MMOL/L (ref 21–32)
CREAT SERPL-MCNC: 1.21 MG/DL (ref 0.55–1.02)
DIFFERENTIAL METHOD BLD: ABNORMAL
EGFR (NO RACE VARIABLE) (RUSH/TITUS): 47 ML/MIN/1.73M²
EOSINOPHIL # BLD AUTO: 0.14 K/UL (ref 0–0.5)
EOSINOPHIL NFR BLD AUTO: 1.5 % (ref 1–4)
ERYTHROCYTE [DISTWIDTH] IN BLOOD BY AUTOMATED COUNT: 14.4 % (ref 11.5–14.5)
GLOBULIN SER-MCNC: 3.9 G/DL (ref 2–4)
GLUCOSE SERPL-MCNC: 93 MG/DL (ref 74–106)
HCT VFR BLD AUTO: 50 % (ref 38–47)
HDLC SERPL-MCNC: 46 MG/DL (ref 40–60)
HGB BLD-MCNC: 15.3 G/DL (ref 12–16)
IMM GRANULOCYTES # BLD AUTO: 0.02 K/UL (ref 0–0.04)
IMM GRANULOCYTES NFR BLD: 0.2 % (ref 0–0.4)
LDLC SERPL CALC-MCNC: 132 MG/DL
LDLC/HDLC SERPL: 2.9 {RATIO}
LYMPHOCYTES # BLD AUTO: 2.83 K/UL (ref 1–4.8)
LYMPHOCYTES NFR BLD AUTO: 30.4 % (ref 27–41)
MCH RBC QN AUTO: 30 PG (ref 27–31)
MCHC RBC AUTO-ENTMCNC: 30.6 G/DL (ref 32–36)
MCV RBC AUTO: 98 FL (ref 80–96)
MONOCYTES # BLD AUTO: 0.69 K/UL (ref 0–0.8)
MONOCYTES NFR BLD AUTO: 7.4 % (ref 2–6)
MPC BLD CALC-MCNC: 11.8 FL (ref 9.4–12.4)
NEUTROPHILS # BLD AUTO: 5.51 K/UL (ref 1.8–7.7)
NEUTROPHILS NFR BLD AUTO: 59.2 % (ref 53–65)
NONHDLC SERPL-MCNC: 165 MG/DL
NRBC # BLD AUTO: 0 X10E3/UL
NRBC, AUTO (.00): 0 %
PLATELET # BLD AUTO: 221 K/UL (ref 150–400)
POTASSIUM SERPL-SCNC: 3.8 MMOL/L (ref 3.5–5.1)
PROT SERPL-MCNC: 7.6 G/DL (ref 6.4–8.2)
RBC # BLD AUTO: 5.1 M/UL (ref 4.2–5.4)
SODIUM SERPL-SCNC: 140 MMOL/L (ref 136–145)
TRIGL SERPL-MCNC: 163 MG/DL (ref 35–150)
VLDLC SERPL-MCNC: 33 MG/DL
WBC # BLD AUTO: 9.31 K/UL (ref 4.5–11)

## 2023-01-04 PROCEDURE — 85025 CBC WITH DIFFERENTIAL: ICD-10-PCS | Mod: ,,, | Performed by: CLINICAL MEDICAL LABORATORY

## 2023-01-04 PROCEDURE — 36415 PR COLLECTION VENOUS BLOOD,VENIPUNCTURE: ICD-10-PCS | Mod: ,,, | Performed by: CLINICAL MEDICAL LABORATORY

## 2023-01-04 PROCEDURE — 36415 COLL VENOUS BLD VENIPUNCTURE: CPT | Mod: ,,, | Performed by: CLINICAL MEDICAL LABORATORY

## 2023-01-04 PROCEDURE — 99214 OFFICE O/P EST MOD 30 MIN: CPT | Mod: ,,, | Performed by: NURSE PRACTITIONER

## 2023-01-04 PROCEDURE — 99214 PR OFFICE/OUTPT VISIT, EST, LEVL IV, 30-39 MIN: ICD-10-PCS | Mod: ,,, | Performed by: NURSE PRACTITIONER

## 2023-01-04 PROCEDURE — 85025 COMPLETE CBC W/AUTO DIFF WBC: CPT | Mod: ,,, | Performed by: CLINICAL MEDICAL LABORATORY

## 2023-01-04 PROCEDURE — 80061 LIPID PANEL: ICD-10-PCS | Mod: ,,, | Performed by: CLINICAL MEDICAL LABORATORY

## 2023-01-04 PROCEDURE — 80053 COMPREHEN METABOLIC PANEL: CPT | Mod: ,,, | Performed by: CLINICAL MEDICAL LABORATORY

## 2023-01-04 PROCEDURE — 80061 LIPID PANEL: CPT | Mod: ,,, | Performed by: CLINICAL MEDICAL LABORATORY

## 2023-01-04 PROCEDURE — 80053 COMPREHENSIVE METABOLIC PANEL: ICD-10-PCS | Mod: ,,, | Performed by: CLINICAL MEDICAL LABORATORY

## 2023-01-04 RX ORDER — ISOSORBIDE MONONITRATE 30 MG/1
30 TABLET, EXTENDED RELEASE ORAL DAILY
Qty: 90 TABLET | Refills: 1 | Status: SHIPPED | OUTPATIENT
Start: 2023-01-04 | End: 2023-06-26 | Stop reason: SDUPTHER

## 2023-01-04 RX ORDER — ROPINIROLE 0.25 MG/1
0.25 TABLET, FILM COATED ORAL 3 TIMES DAILY
Qty: 180 TABLET | Refills: 1 | Status: SHIPPED | OUTPATIENT
Start: 2023-01-04 | End: 2023-06-26 | Stop reason: SDUPTHER

## 2023-01-04 RX ORDER — PANTOPRAZOLE SODIUM 40 MG/1
40 TABLET, DELAYED RELEASE ORAL DAILY
Qty: 90 TABLET | Refills: 1 | Status: SHIPPED | OUTPATIENT
Start: 2023-01-04 | End: 2023-06-26 | Stop reason: SDUPTHER

## 2023-01-04 RX ORDER — AMLODIPINE BESYLATE 5 MG/1
5 TABLET ORAL DAILY
Qty: 90 TABLET | Refills: 1 | Status: SHIPPED | OUTPATIENT
Start: 2023-01-04 | End: 2023-06-26 | Stop reason: SDUPTHER

## 2023-01-04 RX ORDER — METOPROLOL TARTRATE 25 MG/1
12.5 TABLET, FILM COATED ORAL 2 TIMES DAILY
Qty: 90 TABLET | Refills: 1 | Status: SHIPPED | OUTPATIENT
Start: 2023-01-04 | End: 2023-06-26 | Stop reason: SDUPTHER

## 2023-01-04 RX ORDER — ESCITALOPRAM OXALATE 20 MG/1
20 TABLET ORAL DAILY
Qty: 90 TABLET | Refills: 1 | Status: SHIPPED | OUTPATIENT
Start: 2023-01-04 | End: 2023-06-26 | Stop reason: SDUPTHER

## 2023-01-04 RX ORDER — ALBUTEROL SULFATE 90 UG/1
2 AEROSOL, METERED RESPIRATORY (INHALATION) EVERY 6 HOURS PRN
COMMUNITY
End: 2023-01-04 | Stop reason: SDUPTHER

## 2023-01-04 RX ORDER — LISINOPRIL 40 MG/1
40 TABLET ORAL DAILY
Qty: 90 TABLET | Refills: 1 | Status: SHIPPED | OUTPATIENT
Start: 2023-01-04 | End: 2023-06-26 | Stop reason: SDUPTHER

## 2023-01-04 RX ORDER — CLOPIDOGREL BISULFATE 75 MG/1
75 TABLET ORAL ONCE
Qty: 90 TABLET | Refills: 1 | Status: SHIPPED | OUTPATIENT
Start: 2023-01-04 | End: 2023-06-26 | Stop reason: SDUPTHER

## 2023-01-04 RX ORDER — ROSUVASTATIN CALCIUM 40 MG/1
40 TABLET, COATED ORAL NIGHTLY
Qty: 90 TABLET | Refills: 1 | Status: SHIPPED | OUTPATIENT
Start: 2023-01-04 | End: 2023-06-26 | Stop reason: SDUPTHER

## 2023-01-04 RX ORDER — FLUTICASONE FUROATE, UMECLIDINIUM BROMIDE AND VILANTEROL TRIFENATATE 100; 62.5; 25 UG/1; UG/1; UG/1
1 POWDER RESPIRATORY (INHALATION) DAILY
Qty: 60 EACH | Refills: 5 | Status: SHIPPED | OUTPATIENT
Start: 2023-01-04 | End: 2023-10-11 | Stop reason: SDUPTHER

## 2023-01-04 RX ORDER — MONTELUKAST SODIUM 10 MG/1
10 TABLET ORAL DAILY
Qty: 90 TABLET | Refills: 1 | Status: SHIPPED | OUTPATIENT
Start: 2023-01-04 | End: 2023-06-26 | Stop reason: SDUPTHER

## 2023-01-04 RX ORDER — BUSPIRONE HYDROCHLORIDE 10 MG/1
10 TABLET ORAL 3 TIMES DAILY
Qty: 90 TABLET | Refills: 5 | Status: SHIPPED | OUTPATIENT
Start: 2023-01-04 | End: 2023-06-26 | Stop reason: SDUPTHER

## 2023-01-04 RX ORDER — GABAPENTIN 100 MG/1
100 CAPSULE ORAL NIGHTLY
Qty: 90 CAPSULE | Refills: 1 | Status: SHIPPED | OUTPATIENT
Start: 2023-01-04 | End: 2023-06-26 | Stop reason: SDUPTHER

## 2023-01-04 RX ORDER — ALBUTEROL SULFATE 90 UG/1
2 AEROSOL, METERED RESPIRATORY (INHALATION) EVERY 6 HOURS PRN
Qty: 18 G | Refills: 5 | Status: SHIPPED | OUTPATIENT
Start: 2023-01-04

## 2023-01-05 NOTE — PROGRESS NOTES
Subjective:       Patient ID: Livier Romero is a 77 y.o. female.    Chief Complaint: Medication Refill    Ms. Romero presents in follow up for anxiety, HTN, HLD - she reports she has been out of her anxiety meds for a few days and notes her anxiety is significantly worse. No other complaints today, blood pressure is controlled.    Medication Refill  Pertinent negatives include no chills, fatigue or fever.   Review of Systems   Constitutional:  Negative for chills, fatigue and fever.   Respiratory: Negative.     Cardiovascular: Negative.    Gastrointestinal: Negative.    Genitourinary: Negative.    Musculoskeletal: Negative.    Neurological: Negative.    Psychiatric/Behavioral:  Negative for dysphoric mood and sleep disturbance. The patient is nervous/anxious.        Objective:      Physical Exam  Vitals and nursing note reviewed.   Constitutional:       Appearance: Normal appearance.   HENT:      Head: Normocephalic.   Cardiovascular:      Rate and Rhythm: Normal rate and regular rhythm.      Pulses: Normal pulses.      Heart sounds: Normal heart sounds.   Pulmonary:      Effort: Pulmonary effort is normal.      Breath sounds: Normal breath sounds.   Abdominal:      General: Bowel sounds are normal.      Palpations: Abdomen is soft.   Musculoskeletal:         General: Normal range of motion.   Skin:     General: Skin is warm and dry.   Neurological:      Mental Status: She is alert and oriented to person, place, and time.   Psychiatric:         Behavior: Behavior normal.       Assessment:       Problem List Items Addressed This Visit    None  Visit Diagnoses       Primary hypertension    -  Primary    Relevant Medications    amLODIPine (NORVASC) 5 MG tablet    isosorbide mononitrate (IMDUR) 30 MG 24 hr tablet    lisinopriL (PRINIVIL,ZESTRIL) 40 MG tablet    metoprolol tartrate (LOPRESSOR) 25 MG tablet    Other Relevant Orders    Comprehensive Metabolic Panel (Completed)    CBC Auto Differential (Completed)     Cerebrovascular accident (CVA) due to other mechanism        Moderate episode of recurrent major depressive disorder        Relevant Medications    EScitalopram oxalate (LEXAPRO) 20 MG tablet    Restless leg        Relevant Medications    gabapentin (NEURONTIN) 100 MG capsule    rOPINIRole (REQUIP) 0.25 MG tablet    Environmental allergies        Relevant Medications    montelukast (SINGULAIR) 10 mg tablet    Gastroesophageal reflux disease without esophagitis        Relevant Medications    pantoprazole (PROTONIX) 40 MG tablet    Mixed hyperlipidemia        Relevant Medications    rosuvastatin (CRESTOR) 40 MG Tab    Other Relevant Orders    Lipid Panel (Completed)              Plan:        Fasting labs today, we will notify you of results. Medication refills, schedule 6 month follow up unless labs abnormal.

## 2023-01-10 ENCOUNTER — TELEPHONE (OUTPATIENT)
Dept: FAMILY MEDICINE | Facility: CLINIC | Age: 78
End: 2023-01-10
Payer: MEDICARE

## 2023-01-10 NOTE — TELEPHONE ENCOUNTER
Attempted calling pt to notify of results, no answer. Left voicemail      ----- Message from OZ Reyes sent at 1/10/2023  8:11 AM CST -----  Recommend OV to discuss renal function and cholesterol, we can add a med to improve renal function.

## 2023-05-09 DIAGNOSIS — Z71.89 COMPLEX CARE COORDINATION: ICD-10-CM

## 2023-06-26 ENCOUNTER — OFFICE VISIT (OUTPATIENT)
Dept: FAMILY MEDICINE | Facility: CLINIC | Age: 78
End: 2023-06-26
Payer: MEDICARE

## 2023-06-26 VITALS
OXYGEN SATURATION: 96 % | WEIGHT: 223 LBS | HEART RATE: 54 BPM | HEIGHT: 67 IN | RESPIRATION RATE: 18 BRPM | BODY MASS INDEX: 35 KG/M2 | TEMPERATURE: 98 F | DIASTOLIC BLOOD PRESSURE: 60 MMHG | SYSTOLIC BLOOD PRESSURE: 114 MMHG

## 2023-06-26 DIAGNOSIS — I63.89 CEREBROVASCULAR ACCIDENT (CVA) DUE TO OTHER MECHANISM: ICD-10-CM

## 2023-06-26 DIAGNOSIS — F41.9 ANXIETY: ICD-10-CM

## 2023-06-26 DIAGNOSIS — N39.3 STRESS INCONTINENCE, FEMALE: ICD-10-CM

## 2023-06-26 DIAGNOSIS — G25.81 RESTLESS LEG: ICD-10-CM

## 2023-06-26 DIAGNOSIS — E78.2 MIXED HYPERLIPIDEMIA: ICD-10-CM

## 2023-06-26 DIAGNOSIS — K21.9 GASTROESOPHAGEAL REFLUX DISEASE WITHOUT ESOPHAGITIS: ICD-10-CM

## 2023-06-26 DIAGNOSIS — F33.1 MODERATE EPISODE OF RECURRENT MAJOR DEPRESSIVE DISORDER: ICD-10-CM

## 2023-06-26 DIAGNOSIS — I10 PRIMARY HYPERTENSION: Primary | ICD-10-CM

## 2023-06-26 DIAGNOSIS — Z91.09 ENVIRONMENTAL ALLERGIES: ICD-10-CM

## 2023-06-26 LAB
ALBUMIN SERPL BCP-MCNC: 3.6 G/DL (ref 3.5–5)
ALBUMIN/GLOB SERPL: 1.2 {RATIO}
ALP SERPL-CCNC: 131 U/L (ref 55–142)
ALT SERPL W P-5'-P-CCNC: 12 U/L (ref 13–56)
ANION GAP SERPL CALCULATED.3IONS-SCNC: 9 MMOL/L (ref 7–16)
AST SERPL W P-5'-P-CCNC: 11 U/L (ref 15–37)
BASOPHILS # BLD AUTO: 0.08 K/UL (ref 0–0.2)
BASOPHILS NFR BLD AUTO: 0.9 % (ref 0–1)
BILIRUB SERPL-MCNC: 0.3 MG/DL (ref ?–1.2)
BUN SERPL-MCNC: 19 MG/DL (ref 7–18)
BUN/CREAT SERPL: 15 (ref 6–20)
CALCIUM SERPL-MCNC: 9.4 MG/DL (ref 8.5–10.1)
CHLORIDE SERPL-SCNC: 111 MMOL/L (ref 98–107)
CO2 SERPL-SCNC: 27 MMOL/L (ref 21–32)
CREAT SERPL-MCNC: 1.25 MG/DL (ref 0.55–1.02)
DIFFERENTIAL METHOD BLD: ABNORMAL
EGFR (NO RACE VARIABLE) (RUSH/TITUS): 44 ML/MIN/1.73M2
EOSINOPHIL # BLD AUTO: 0.12 K/UL (ref 0–0.5)
EOSINOPHIL NFR BLD AUTO: 1.3 % (ref 1–4)
ERYTHROCYTE [DISTWIDTH] IN BLOOD BY AUTOMATED COUNT: 14.1 % (ref 11.5–14.5)
GLOBULIN SER-MCNC: 3.1 G/DL (ref 2–4)
GLUCOSE SERPL-MCNC: 88 MG/DL (ref 74–106)
HCT VFR BLD AUTO: 43.3 % (ref 38–47)
HGB BLD-MCNC: 13.1 G/DL (ref 12–16)
IMM GRANULOCYTES # BLD AUTO: 0.03 K/UL (ref 0–0.04)
IMM GRANULOCYTES NFR BLD: 0.3 % (ref 0–0.4)
LYMPHOCYTES # BLD AUTO: 2.04 K/UL (ref 1–4.8)
LYMPHOCYTES NFR BLD AUTO: 22.5 % (ref 27–41)
MCH RBC QN AUTO: 28.2 PG (ref 27–31)
MCHC RBC AUTO-ENTMCNC: 30.3 G/DL (ref 32–36)
MCV RBC AUTO: 93.3 FL (ref 80–96)
MONOCYTES # BLD AUTO: 0.74 K/UL (ref 0–0.8)
MONOCYTES NFR BLD AUTO: 8.2 % (ref 2–6)
MPC BLD CALC-MCNC: 11.9 FL (ref 9.4–12.4)
NEUTROPHILS # BLD AUTO: 6.05 K/UL (ref 1.8–7.7)
NEUTROPHILS NFR BLD AUTO: 66.8 % (ref 53–65)
NRBC # BLD AUTO: 0 X10E3/UL
NRBC, AUTO (.00): 0 %
PLATELET # BLD AUTO: 206 K/UL (ref 150–400)
POTASSIUM SERPL-SCNC: 3.6 MMOL/L (ref 3.5–5.1)
PROT SERPL-MCNC: 6.7 G/DL (ref 6.4–8.2)
RBC # BLD AUTO: 4.64 M/UL (ref 4.2–5.4)
SODIUM SERPL-SCNC: 143 MMOL/L (ref 136–145)
WBC # BLD AUTO: 9.06 K/UL (ref 4.5–11)

## 2023-06-26 PROCEDURE — 99214 OFFICE O/P EST MOD 30 MIN: CPT | Mod: ,,, | Performed by: NURSE PRACTITIONER

## 2023-06-26 PROCEDURE — 85025 CBC WITH DIFFERENTIAL: ICD-10-PCS | Mod: ,,, | Performed by: CLINICAL MEDICAL LABORATORY

## 2023-06-26 PROCEDURE — 85025 COMPLETE CBC W/AUTO DIFF WBC: CPT | Mod: ,,, | Performed by: CLINICAL MEDICAL LABORATORY

## 2023-06-26 PROCEDURE — 80053 COMPREHEN METABOLIC PANEL: CPT | Mod: ,,, | Performed by: CLINICAL MEDICAL LABORATORY

## 2023-06-26 PROCEDURE — 80053 COMPREHENSIVE METABOLIC PANEL: ICD-10-PCS | Mod: ,,, | Performed by: CLINICAL MEDICAL LABORATORY

## 2023-06-26 PROCEDURE — 99214 PR OFFICE/OUTPT VISIT, EST, LEVL IV, 30-39 MIN: ICD-10-PCS | Mod: ,,, | Performed by: NURSE PRACTITIONER

## 2023-06-26 RX ORDER — MONTELUKAST SODIUM 10 MG/1
10 TABLET ORAL DAILY
Qty: 90 TABLET | Refills: 1 | Status: SHIPPED | OUTPATIENT
Start: 2023-06-26 | End: 2023-10-11 | Stop reason: SDUPTHER

## 2023-06-26 RX ORDER — PANTOPRAZOLE SODIUM 40 MG/1
40 TABLET, DELAYED RELEASE ORAL DAILY
Qty: 90 TABLET | Refills: 1 | Status: SHIPPED | OUTPATIENT
Start: 2023-06-26 | End: 2023-10-11 | Stop reason: SDUPTHER

## 2023-06-26 RX ORDER — CLOPIDOGREL BISULFATE 75 MG/1
75 TABLET ORAL DAILY
Qty: 90 TABLET | Refills: 1 | Status: SHIPPED | OUTPATIENT
Start: 2023-06-26 | End: 2023-10-11 | Stop reason: SDUPTHER

## 2023-06-26 RX ORDER — ROSUVASTATIN CALCIUM 40 MG/1
40 TABLET, COATED ORAL NIGHTLY
Qty: 90 TABLET | Refills: 1 | Status: SHIPPED | OUTPATIENT
Start: 2023-06-26 | End: 2023-10-11 | Stop reason: SDUPTHER

## 2023-06-26 RX ORDER — METOPROLOL TARTRATE 25 MG/1
12.5 TABLET, FILM COATED ORAL 2 TIMES DAILY
Qty: 90 TABLET | Refills: 1 | Status: SHIPPED | OUTPATIENT
Start: 2023-06-26 | End: 2023-10-11 | Stop reason: SDUPTHER

## 2023-06-26 RX ORDER — ISOSORBIDE MONONITRATE 30 MG/1
30 TABLET, EXTENDED RELEASE ORAL DAILY
Qty: 90 TABLET | Refills: 1 | Status: SHIPPED | OUTPATIENT
Start: 2023-06-26 | End: 2024-02-09 | Stop reason: SDUPTHER

## 2023-06-26 RX ORDER — ROPINIROLE 0.25 MG/1
0.25 TABLET, FILM COATED ORAL 3 TIMES DAILY
Qty: 180 TABLET | Refills: 1 | Status: SHIPPED | OUTPATIENT
Start: 2023-06-26 | End: 2024-02-09 | Stop reason: SDUPTHER

## 2023-06-26 RX ORDER — AMLODIPINE BESYLATE 5 MG/1
5 TABLET ORAL DAILY
Qty: 90 TABLET | Refills: 1 | Status: SHIPPED | OUTPATIENT
Start: 2023-06-26 | End: 2023-10-11 | Stop reason: SDUPTHER

## 2023-06-26 RX ORDER — BUSPIRONE HYDROCHLORIDE 10 MG/1
10 TABLET ORAL 3 TIMES DAILY
Qty: 90 TABLET | Refills: 5 | Status: SHIPPED | OUTPATIENT
Start: 2023-06-26 | End: 2023-10-11 | Stop reason: SDUPTHER

## 2023-06-26 RX ORDER — LISINOPRIL 40 MG/1
40 TABLET ORAL DAILY
Qty: 90 TABLET | Refills: 1 | Status: SHIPPED | OUTPATIENT
Start: 2023-06-26 | End: 2024-02-14 | Stop reason: SDUPTHER

## 2023-06-26 RX ORDER — GABAPENTIN 100 MG/1
100 CAPSULE ORAL NIGHTLY
Qty: 90 CAPSULE | Refills: 1 | Status: SHIPPED | OUTPATIENT
Start: 2023-06-26 | End: 2023-10-11 | Stop reason: SDUPTHER

## 2023-06-26 RX ORDER — TOLTERODINE 2 MG/1
2 CAPSULE, EXTENDED RELEASE ORAL DAILY
Qty: 30 CAPSULE | Refills: 0 | Status: SHIPPED | OUTPATIENT
Start: 2023-06-26

## 2023-06-26 RX ORDER — ESCITALOPRAM OXALATE 20 MG/1
20 TABLET ORAL DAILY
Qty: 90 TABLET | Refills: 1 | Status: SHIPPED | OUTPATIENT
Start: 2023-06-26 | End: 2023-10-11 | Stop reason: SDUPTHER

## 2023-06-26 NOTE — PROGRESS NOTES
[facility]    Chief Complaint      Chief Complaint   Patient presents with    Follow-up     5 month follow up for elevated bp    PES - Care Gap    Medication Refill       History of Present Illness      Livier Romero is a 78 y.o. female. She  has a past medical history of Adrenal adenoma, Anxiety, Asthma, Ataxic gait, Cerebral infarction, COPD (chronic obstructive pulmonary disease), Depression, Hearing loss, Hyperlipidemia, Hypertension, and Restless legs., who presents today for routine f/u of her HTN    Past Medical History:  Past Medical History:   Diagnosis Date    Adrenal adenoma     Anxiety     Asthma     Ataxic gait     Cerebral infarction     COPD (chronic obstructive pulmonary disease)     Depression     Hearing loss     Hyperlipidemia     Hypertension     Restless legs        Past Surgical History:   has no past surgical history on file.    Social History:  Social History     Tobacco Use    Smoking status: Former     Types: Cigarettes     Quit date: 2021     Years since quittin.0    Smokeless tobacco: Never   Substance Use Topics    Alcohol use: Not Currently    Drug use: Never       I personally reviewed all past medical, surgical, and social.     Review of Systems   Constitutional:  Negative for fatigue.   Eyes:  Negative for visual disturbance.   Respiratory:  Negative for cough and shortness of breath.    Cardiovascular: Negative.    Gastrointestinal:  Negative for abdominal pain, constipation and diarrhea.   Genitourinary:  Negative for difficulty urinating.   Musculoskeletal:  Negative for gait problem.   Skin: Negative.    Neurological:  Negative for dizziness and light-headedness.      Medications:  Outpatient Encounter Medications as of 2023   Medication Sig Dispense Refill    albuterol (PROVENTIL/VENTOLIN HFA) 90 mcg/actuation inhaler Inhale 2 puffs into the lungs every 6 (six) hours as needed for Wheezing. Rescue 18 g 5    TRELEGY ELLIPTA 100-62.5-25 mcg DsDv Take 1 puff  by mouth once daily. 60 each 5    [DISCONTINUED] amLODIPine (NORVASC) 5 MG tablet Take 1 tablet (5 mg total) by mouth once daily. 90 tablet 1    [DISCONTINUED] busPIRone (BUSPAR) 10 MG tablet Take 1 tablet (10 mg total) by mouth 3 (three) times daily. As needed for anxiety 90 tablet 5    [DISCONTINUED] EScitalopram oxalate (LEXAPRO) 20 MG tablet Take 1 tablet (20 mg total) by mouth once daily. 90 tablet 1    [DISCONTINUED] gabapentin (NEURONTIN) 100 MG capsule Take 1 capsule (100 mg total) by mouth every evening. 90 capsule 1    [DISCONTINUED] isosorbide mononitrate (IMDUR) 30 MG 24 hr tablet Take 1 tablet (30 mg total) by mouth once daily. 90 tablet 1    [DISCONTINUED] lisinopriL (PRINIVIL,ZESTRIL) 40 MG tablet Take 1 tablet (40 mg total) by mouth once daily. 90 tablet 1    [DISCONTINUED] metoprolol tartrate (LOPRESSOR) 25 MG tablet Take 0.5 tablets (12.5 mg total) by mouth 2 (two) times daily. 90 tablet 1    [DISCONTINUED] montelukast (SINGULAIR) 10 mg tablet Take 1 tablet (10 mg total) by mouth once daily. 90 tablet 1    [DISCONTINUED] pantoprazole (PROTONIX) 40 MG tablet Take 1 tablet (40 mg total) by mouth once daily. 90 tablet 1    [DISCONTINUED] rOPINIRole (REQUIP) 0.25 MG tablet Take 1 tablet (0.25 mg total) by mouth 3 (three) times daily. 180 tablet 1    [DISCONTINUED] rosuvastatin (CRESTOR) 40 MG Tab Take 1 tablet (40 mg total) by mouth every evening. 90 tablet 1    amLODIPine (NORVASC) 5 MG tablet Take 1 tablet (5 mg total) by mouth once daily. 90 tablet 1    aspirin 81 MG Chew Take 1 tablet (81 mg total) by mouth once daily. 90 tablet 1    busPIRone (BUSPAR) 10 MG tablet Take 1 tablet (10 mg total) by mouth 3 (three) times daily. As needed for anxiety 90 tablet 5    clopidogreL (PLAVIX) 75 mg tablet Take 1 tablet (75 mg total) by mouth once daily. 90 tablet 1    EScitalopram oxalate (LEXAPRO) 20 MG tablet Take 1 tablet (20 mg total) by mouth once daily. 90 tablet 1    gabapentin (NEURONTIN) 100 MG  capsule Take 1 capsule (100 mg total) by mouth every evening. 90 capsule 1    isosorbide mononitrate (IMDUR) 30 MG 24 hr tablet Take 1 tablet (30 mg total) by mouth once daily. 90 tablet 1    lisinopriL (PRINIVIL,ZESTRIL) 40 MG tablet Take 1 tablet (40 mg total) by mouth once daily. 90 tablet 1    metoprolol tartrate (LOPRESSOR) 25 MG tablet Take 0.5 tablets (12.5 mg total) by mouth 2 (two) times daily. 90 tablet 1    montelukast (SINGULAIR) 10 mg tablet Take 1 tablet (10 mg total) by mouth once daily. 90 tablet 1    pantoprazole (PROTONIX) 40 MG tablet Take 1 tablet (40 mg total) by mouth once daily. 90 tablet 1    rOPINIRole (REQUIP) 0.25 MG tablet Take 1 tablet (0.25 mg total) by mouth 3 (three) times daily. 180 tablet 1    rosuvastatin (CRESTOR) 40 MG Tab Take 1 tablet (40 mg total) by mouth every evening. 90 tablet 1    tolterodine (DETROL LA) 2 MG Cp24 Take 1 capsule (2 mg total) by mouth once daily. 30 capsule 0    [DISCONTINUED] clopidogreL (PLAVIX) 75 mg tablet Take 1 tablet (75 mg total) by mouth once. for 1 dose 90 tablet 1     No facility-administered encounter medications on file as of 6/26/2023.       Allergies:  Review of patient's allergies indicates:   Allergen Reactions    Cisplatin Anaphylaxis    Levofloxacin Other (See Comments)     bronchospasm    Shellfish containing products     Macrobid [nitrofurantoin monohyd/m-cryst] Nausea And Vomiting       Health Maintenance:  Immunization History   Administered Date(s) Administered    Pneumococcal Conjugate - 13 Valent 01/09/2018    Pneumococcal Polysaccharide - 23 Valent 10/03/2016      Health Maintenance   Topic Date Due    Hepatitis C Screening  Never done    TETANUS VACCINE  Never done    DEXA Scan  Never done    Lipid Panel  01/04/2024        Physical Exam      Vital Signs  Temp: 98.2 °F (36.8 °C)  Temp Source: Oral  Pulse: (!) 54  Resp: 18  SpO2: 96 %  BP: 114/60  BP Location: Left arm  Patient Position: Sitting  Pain Score: 0-No pain  Height and  "Weight  Height: 5' 7" (170.2 cm)  Weight: 101.2 kg (223 lb)  BSA (Calculated - sq m): 2.19 sq meters  BMI (Calculated): 34.9  Weight in (lb) to have BMI = 25: 159.3]    Physical Exam  Vitals and nursing note reviewed.   Constitutional:       Appearance: Normal appearance. She is well-developed. She is obese.   HENT:      Head: Normocephalic.      Right Ear: Hearing normal.      Left Ear: Hearing normal.      Nose: Nose normal.   Eyes:      General: Lids are normal.      Conjunctiva/sclera: Conjunctivae normal.   Cardiovascular:      Rate and Rhythm: Regular rhythm. Bradycardia present.      Heart sounds: Normal heart sounds.   Pulmonary:      Effort: Pulmonary effort is normal.      Breath sounds: Normal breath sounds.   Musculoskeletal:         General: Normal range of motion.      Cervical back: Normal range of motion and neck supple.      Right lower leg: No edema.      Left lower leg: No edema.   Skin:     General: Skin is warm and dry.   Neurological:      Mental Status: She is alert and oriented to person, place, and time.      Gait: Gait is intact.   Psychiatric:         Behavior: Behavior is cooperative.        Laboratory:  CBC:  Recent Labs   Lab 06/13/22  1457 01/04/23  1522 06/26/23  1625   WBC 7.97 9.31 9.06   RBC 5.03 5.10 4.64   Hemoglobin 15.0 15.3 13.1   Hematocrit 47.2 H 50.0 H 43.3   Platelet Count 213 221 206   MCV 93.8 98.0 H 93.3   MCH 29.8 30.0 28.2   MCHC 31.8 L 30.6 L 30.3 L     CMP:  Recent Labs   Lab 06/13/22  1457 01/04/23  1522 06/26/23  1625   Glucose 94 93 88   Calcium 9.8 10.0 9.4   Albumin 3.4 L 3.7 3.6   Total Protein 7.0 7.6 6.7   Sodium 144 140 143   Potassium 3.9 3.8 3.6   CO2 28 29 27   Chloride 109 H 106 111 H   BUN 13 21 H 19 H   Alk Phos 122 139 131   ALT 18 17 12 L   AST 18 14 L 11 L   Bilirubin, Total 0.3 0.3 0.3     LIPIDS:  Recent Labs   Lab 06/13/22  1457 01/04/23  1522   TSH 1.880  --    HDL Cholesterol 46 46   Cholesterol 238 H 211 H   Triglycerides 256 H 163 H   LDL " Calculated 141 132   Cholesterol/HDL Ratio (Risk Factor) 5.2 4.6   Non- 165     TSH:  Recent Labs   Lab 06/13/22  1457   TSH 1.880     A1C:        Assessment/Plan     Livier Romero is a 78 y.o.female with:     1. Restless leg  - rOPINIRole (REQUIP) 0.25 MG tablet; Take 1 tablet (0.25 mg total) by mouth 3 (three) times daily.  Dispense: 180 tablet; Refill: 1  - gabapentin (NEURONTIN) 100 MG capsule; Take 1 capsule (100 mg total) by mouth every evening.  Dispense: 90 capsule; Refill: 1    2. Environmental allergies  - montelukast (SINGULAIR) 10 mg tablet; Take 1 tablet (10 mg total) by mouth once daily.  Dispense: 90 tablet; Refill: 1    3. Primary hypertension  - metoprolol tartrate (LOPRESSOR) 25 MG tablet; Take 0.5 tablets (12.5 mg total) by mouth 2 (two) times daily.  Dispense: 90 tablet; Refill: 1  - isosorbide mononitrate (IMDUR) 30 MG 24 hr tablet; Take 1 tablet (30 mg total) by mouth once daily.  Dispense: 90 tablet; Refill: 1  - lisinopriL (PRINIVIL,ZESTRIL) 40 MG tablet; Take 1 tablet (40 mg total) by mouth once daily.  Dispense: 90 tablet; Refill: 1  - amLODIPine (NORVASC) 5 MG tablet; Take 1 tablet (5 mg total) by mouth once daily.  Dispense: 90 tablet; Refill: 1  - Comprehensive Metabolic Panel; Future  - CBC Auto Differential; Future  - Comprehensive Metabolic Panel  - CBC Auto Differential    4. Moderate episode of recurrent major depressive disorder  - EScitalopram oxalate (LEXAPRO) 20 MG tablet; Take 1 tablet (20 mg total) by mouth once daily.  Dispense: 90 tablet; Refill: 1    5. Gastroesophageal reflux disease without esophagitis  - pantoprazole (PROTONIX) 40 MG tablet; Take 1 tablet (40 mg total) by mouth once daily.  Dispense: 90 tablet; Refill: 1    6. Mixed hyperlipidemia  - rosuvastatin (CRESTOR) 40 MG Tab; Take 1 tablet (40 mg total) by mouth every evening.  Dispense: 90 tablet; Refill: 1    7. Cerebrovascular accident (CVA) due to other mechanism  - clopidogreL (PLAVIX) 75 mg tablet;  Take 1 tablet (75 mg total) by mouth once daily.  Dispense: 90 tablet; Refill: 1    8. Anxiety  - busPIRone (BUSPAR) 10 MG tablet; Take 1 tablet (10 mg total) by mouth 3 (three) times daily. As needed for anxiety  Dispense: 90 tablet; Refill: 5    9. Stress incontinence, female  - tolterodine (DETROL LA) 2 MG Cp24; Take 1 capsule (2 mg total) by mouth once daily.  Dispense: 30 capsule; Refill: 0       Total time spent face-to-face and non-face-to-face coordinating care for this encounter was: 30 minutes min    Chronic conditions status updated as per HPI.  Other than changes above, cont current medications and maintain follow up with specialists.  Return to clinic in 6 month(s).    OZ Kraus  [facility]

## 2023-06-26 NOTE — PROGRESS NOTES
[facility]    Chief Complaint      Chief Complaint   Patient presents with    Follow-up     5 month follow up for elevated bp    PES - Care Gap    Medication Refill       History of Present Illness      Livier Romero is a 78 y.o. female. She  has a past medical history of Adrenal adenoma, Anxiety, Asthma, Ataxic gait, Cerebral infarction, COPD (chronic obstructive pulmonary disease), Depression, Hearing loss, Hyperlipidemia, Hypertension, and Restless legs., who presents today for f/u and medication refills.  Patient admits that she has been wearing a brief for the last 6mo or so due to stress incontinence and episodes of not making it the bathroom in time.     Past Medical History:  Past Medical History:   Diagnosis Date    Adrenal adenoma     Anxiety     Asthma     Ataxic gait     Cerebral infarction     COPD (chronic obstructive pulmonary disease)     Depression     Hearing loss     Hyperlipidemia     Hypertension     Restless legs        Past Surgical History:   has no past surgical history on file.    Social History:  Social History     Tobacco Use    Smoking status: Former     Types: Cigarettes     Quit date: 2021     Years since quittin.0    Smokeless tobacco: Never   Substance Use Topics    Alcohol use: Not Currently    Drug use: Never       I personally reviewed all past medical, surgical, and social.     Review of Systems   Constitutional:  Negative for fatigue.   Eyes:  Negative for visual disturbance.   Respiratory:  Negative for cough and shortness of breath.    Cardiovascular: Negative.    Gastrointestinal:  Negative for abdominal pain, constipation and diarrhea.   Genitourinary:         Stress incontinence   Musculoskeletal:  Negative for gait problem.   Skin: Negative.    Neurological:  Negative for dizziness and light-headedness.      Medications:  Outpatient Encounter Medications as of 2023   Medication Sig Dispense Refill    albuterol (PROVENTIL/VENTOLIN HFA) 90  mcg/actuation inhaler Inhale 2 puffs into the lungs every 6 (six) hours as needed for Wheezing. Rescue 18 g 5    TRELEGY ELLIPTA 100-62.5-25 mcg DsDv Take 1 puff by mouth once daily. 60 each 5    [DISCONTINUED] amLODIPine (NORVASC) 5 MG tablet Take 1 tablet (5 mg total) by mouth once daily. 90 tablet 1    [DISCONTINUED] busPIRone (BUSPAR) 10 MG tablet Take 1 tablet (10 mg total) by mouth 3 (three) times daily. As needed for anxiety 90 tablet 5    [DISCONTINUED] EScitalopram oxalate (LEXAPRO) 20 MG tablet Take 1 tablet (20 mg total) by mouth once daily. 90 tablet 1    [DISCONTINUED] gabapentin (NEURONTIN) 100 MG capsule Take 1 capsule (100 mg total) by mouth every evening. 90 capsule 1    [DISCONTINUED] isosorbide mononitrate (IMDUR) 30 MG 24 hr tablet Take 1 tablet (30 mg total) by mouth once daily. 90 tablet 1    [DISCONTINUED] lisinopriL (PRINIVIL,ZESTRIL) 40 MG tablet Take 1 tablet (40 mg total) by mouth once daily. 90 tablet 1    [DISCONTINUED] metoprolol tartrate (LOPRESSOR) 25 MG tablet Take 0.5 tablets (12.5 mg total) by mouth 2 (two) times daily. 90 tablet 1    [DISCONTINUED] montelukast (SINGULAIR) 10 mg tablet Take 1 tablet (10 mg total) by mouth once daily. 90 tablet 1    [DISCONTINUED] pantoprazole (PROTONIX) 40 MG tablet Take 1 tablet (40 mg total) by mouth once daily. 90 tablet 1    [DISCONTINUED] rOPINIRole (REQUIP) 0.25 MG tablet Take 1 tablet (0.25 mg total) by mouth 3 (three) times daily. 180 tablet 1    [DISCONTINUED] rosuvastatin (CRESTOR) 40 MG Tab Take 1 tablet (40 mg total) by mouth every evening. 90 tablet 1    amLODIPine (NORVASC) 5 MG tablet Take 1 tablet (5 mg total) by mouth once daily. 90 tablet 1    aspirin 81 MG Chew Take 1 tablet (81 mg total) by mouth once daily. 90 tablet 1    busPIRone (BUSPAR) 10 MG tablet Take 1 tablet (10 mg total) by mouth 3 (three) times daily. As needed for anxiety 90 tablet 5    clopidogreL (PLAVIX) 75 mg tablet Take 1 tablet (75 mg total) by mouth once  daily. 90 tablet 1    EScitalopram oxalate (LEXAPRO) 20 MG tablet Take 1 tablet (20 mg total) by mouth once daily. 90 tablet 1    gabapentin (NEURONTIN) 100 MG capsule Take 1 capsule (100 mg total) by mouth every evening. 90 capsule 1    isosorbide mononitrate (IMDUR) 30 MG 24 hr tablet Take 1 tablet (30 mg total) by mouth once daily. 90 tablet 1    lisinopriL (PRINIVIL,ZESTRIL) 40 MG tablet Take 1 tablet (40 mg total) by mouth once daily. 90 tablet 1    metoprolol tartrate (LOPRESSOR) 25 MG tablet Take 0.5 tablets (12.5 mg total) by mouth 2 (two) times daily. 90 tablet 1    montelukast (SINGULAIR) 10 mg tablet Take 1 tablet (10 mg total) by mouth once daily. 90 tablet 1    pantoprazole (PROTONIX) 40 MG tablet Take 1 tablet (40 mg total) by mouth once daily. 90 tablet 1    rOPINIRole (REQUIP) 0.25 MG tablet Take 1 tablet (0.25 mg total) by mouth 3 (three) times daily. 180 tablet 1    rosuvastatin (CRESTOR) 40 MG Tab Take 1 tablet (40 mg total) by mouth every evening. 90 tablet 1    tolterodine (DETROL LA) 2 MG Cp24 Take 1 capsule (2 mg total) by mouth once daily. 30 capsule 0    [DISCONTINUED] clopidogreL (PLAVIX) 75 mg tablet Take 1 tablet (75 mg total) by mouth once. for 1 dose 90 tablet 1     No facility-administered encounter medications on file as of 6/26/2023.       Allergies:  Review of patient's allergies indicates:   Allergen Reactions    Cisplatin Anaphylaxis    Levofloxacin Other (See Comments)     bronchospasm    Shellfish containing products     Macrobid [nitrofurantoin monohyd/m-cryst] Nausea And Vomiting       Health Maintenance:  Immunization History   Administered Date(s) Administered    Pneumococcal Conjugate - 13 Valent 01/09/2018    Pneumococcal Polysaccharide - 23 Valent 10/03/2016      Health Maintenance   Topic Date Due    Hepatitis C Screening  Never done    TETANUS VACCINE  Never done    DEXA Scan  Never done    Lipid Panel  01/04/2024        Physical Exam      Vital Signs  Temp: 98.2 °F  "(36.8 °C)  Temp Source: Oral  Pulse: (!) 54  Resp: 18  SpO2: 96 %  BP: 114/60  BP Location: Left arm  Patient Position: Sitting  Pain Score: 0-No pain  Height and Weight  Height: 5' 7" (170.2 cm)  Weight: 101.2 kg (223 lb)  BSA (Calculated - sq m): 2.19 sq meters  BMI (Calculated): 34.9  Weight in (lb) to have BMI = 25: 159.3]    Physical Exam  Vitals and nursing note reviewed.   Constitutional:       Appearance: Normal appearance. She is well-developed. She is obese.   HENT:      Head: Normocephalic.      Right Ear: Hearing normal.      Left Ear: Hearing normal.      Nose: Nose normal.   Eyes:      General: Lids are normal.      Conjunctiva/sclera: Conjunctivae normal.   Cardiovascular:      Rate and Rhythm: Regular rhythm. Bradycardia present.      Heart sounds: Normal heart sounds.   Pulmonary:      Effort: Pulmonary effort is normal.      Breath sounds: Normal breath sounds.   Musculoskeletal:         General: Normal range of motion.      Cervical back: Normal range of motion and neck supple.      Right lower leg: No edema.      Left lower leg: No edema.   Skin:     General: Skin is warm and dry.   Neurological:      Mental Status: She is alert and oriented to person, place, and time.      Gait: Gait is intact.   Psychiatric:         Behavior: Behavior is cooperative.        Laboratory:  CBC:  Recent Labs   Lab 06/13/22  1457 01/04/23  1522 06/26/23  1625   WBC 7.97 9.31 9.06   RBC 5.03 5.10 4.64   Hemoglobin 15.0 15.3 13.1   Hematocrit 47.2 H 50.0 H 43.3   Platelet Count 213 221 206   MCV 93.8 98.0 H 93.3   MCH 29.8 30.0 28.2   MCHC 31.8 L 30.6 L 30.3 L     CMP:  Recent Labs   Lab 06/13/22  1457 01/04/23  1522 06/26/23  1625   Glucose 94 93 88   Calcium 9.8 10.0 9.4   Albumin 3.4 L 3.7 3.6   Total Protein 7.0 7.6 6.7   Sodium 144 140 143   Potassium 3.9 3.8 3.6   CO2 28 29 27   Chloride 109 H 106 111 H   BUN 13 21 H 19 H   Alk Phos 122 139 131   ALT 18 17 12 L   AST 18 14 L 11 L   Bilirubin, Total 0.3 0.3 0.3 "     LIPIDS:  Recent Labs   Lab 06/13/22  1457 01/04/23  1522   TSH 1.880  --    HDL Cholesterol 46 46   Cholesterol 238 H 211 H   Triglycerides 256 H 163 H   LDL Calculated 141 132   Cholesterol/HDL Ratio (Risk Factor) 5.2 4.6   Non- 165     TSH:  Recent Labs   Lab 06/13/22  1457   TSH 1.880     A1C:        Assessment/Plan     Livier Romero is a 78 y.o.female with:     1. Restless leg  - rOPINIRole (REQUIP) 0.25 MG tablet; Take 1 tablet (0.25 mg total) by mouth 3 (three) times daily.  Dispense: 180 tablet; Refill: 1  - gabapentin (NEURONTIN) 100 MG capsule; Take 1 capsule (100 mg total) by mouth every evening.  Dispense: 90 capsule; Refill: 1    2. Environmental allergies  - montelukast (SINGULAIR) 10 mg tablet; Take 1 tablet (10 mg total) by mouth once daily.  Dispense: 90 tablet; Refill: 1    3. Primary hypertension  - metoprolol tartrate (LOPRESSOR) 25 MG tablet; Take 0.5 tablets (12.5 mg total) by mouth 2 (two) times daily.  Dispense: 90 tablet; Refill: 1  - isosorbide mononitrate (IMDUR) 30 MG 24 hr tablet; Take 1 tablet (30 mg total) by mouth once daily.  Dispense: 90 tablet; Refill: 1  - lisinopriL (PRINIVIL,ZESTRIL) 40 MG tablet; Take 1 tablet (40 mg total) by mouth once daily.  Dispense: 90 tablet; Refill: 1  - amLODIPine (NORVASC) 5 MG tablet; Take 1 tablet (5 mg total) by mouth once daily.  Dispense: 90 tablet; Refill: 1  - Comprehensive Metabolic Panel; Future  - CBC Auto Differential; Future  - Comprehensive Metabolic Panel  - CBC Auto Differential    4. Moderate episode of recurrent major depressive disorder  - EScitalopram oxalate (LEXAPRO) 20 MG tablet; Take 1 tablet (20 mg total) by mouth once daily.  Dispense: 90 tablet; Refill: 1    5. Gastroesophageal reflux disease without esophagitis  - pantoprazole (PROTONIX) 40 MG tablet; Take 1 tablet (40 mg total) by mouth once daily.  Dispense: 90 tablet; Refill: 1    6. Mixed hyperlipidemia  - rosuvastatin (CRESTOR) 40 MG Tab; Take 1 tablet (40 mg  total) by mouth every evening.  Dispense: 90 tablet; Refill: 1    7. Cerebrovascular accident (CVA) due to other mechanism  - clopidogreL (PLAVIX) 75 mg tablet; Take 1 tablet (75 mg total) by mouth once daily.  Dispense: 90 tablet; Refill: 1    8. Anxiety  - busPIRone (BUSPAR) 10 MG tablet; Take 1 tablet (10 mg total) by mouth 3 (three) times daily. As needed for anxiety  Dispense: 90 tablet; Refill: 5    9. Stress incontinence, female  - tolterodine (DETROL LA) 2 MG Cp24; Take 1 capsule (2 mg total) by mouth once daily.  Dispense: 30 capsule; Refill: 0       Patient also uses oxygen at home- needs to continue using r/t her COPD      Total time spent face-to-face and non-face-to-face coordinating care for this encounter was: 30 minutes     Chronic conditions status updated as per HPI.  Other than changes above, cont current medications and maintain follow up with specialists.  Return to clinic in 6 month(s).    OZ Kraus  [facility]

## 2023-07-02 PROBLEM — K21.9 GASTROESOPHAGEAL REFLUX DISEASE WITHOUT ESOPHAGITIS: Status: ACTIVE | Noted: 2023-07-02

## 2023-07-02 PROBLEM — E78.2 MIXED HYPERLIPIDEMIA: Status: ACTIVE | Noted: 2023-07-02

## 2023-07-02 PROBLEM — F41.9 ANXIETY: Status: ACTIVE | Noted: 2023-07-02

## 2023-07-02 PROBLEM — I10 PRIMARY HYPERTENSION: Status: ACTIVE | Noted: 2023-07-02

## 2023-07-02 PROBLEM — I63.9 CEREBROVASCULAR ACCIDENT: Status: ACTIVE | Noted: 2023-07-02

## 2023-07-03 ENCOUNTER — TELEPHONE (OUTPATIENT)
Dept: FAMILY MEDICINE | Facility: CLINIC | Age: 78
End: 2023-07-03
Payer: MEDICARE

## 2023-07-03 NOTE — TELEPHONE ENCOUNTER
----- Message from OZ Kraus sent at 7/2/2023  6:39 PM CDT -----  Kidney function still declined but stable

## 2023-07-03 NOTE — LETTER
July 3, 2023    Livier Romero  [address] [place] [address] [place] [id number]             [facility]  [address] [place] [address] [place] [address]  Phone: [phone]  Fax: [phone] Dear Ms. Romero:    The results of your recent labs are stable.      If you have any questions or concerns, please don't hesitate to call.    Sincerely,      Christianne Leone LPN

## 2023-10-02 PROBLEM — I63.9 CEREBROVASCULAR ACCIDENT: Status: RESOLVED | Noted: 2023-07-02 | Resolved: 2023-10-02

## 2023-10-11 DIAGNOSIS — F41.9 ANXIETY: ICD-10-CM

## 2023-10-11 DIAGNOSIS — K21.9 GASTROESOPHAGEAL REFLUX DISEASE WITHOUT ESOPHAGITIS: ICD-10-CM

## 2023-10-11 DIAGNOSIS — I10 PRIMARY HYPERTENSION: ICD-10-CM

## 2023-10-11 DIAGNOSIS — Z91.09 ENVIRONMENTAL ALLERGIES: ICD-10-CM

## 2023-10-11 DIAGNOSIS — F33.1 MODERATE EPISODE OF RECURRENT MAJOR DEPRESSIVE DISORDER: ICD-10-CM

## 2023-10-11 DIAGNOSIS — E78.2 MIXED HYPERLIPIDEMIA: ICD-10-CM

## 2023-10-11 DIAGNOSIS — G25.81 RESTLESS LEG: ICD-10-CM

## 2023-10-11 DIAGNOSIS — I63.89 CEREBROVASCULAR ACCIDENT (CVA) DUE TO OTHER MECHANISM: ICD-10-CM

## 2023-10-11 RX ORDER — BUSPIRONE HYDROCHLORIDE 10 MG/1
10 TABLET ORAL 3 TIMES DAILY
Qty: 90 TABLET | Refills: 5 | Status: SHIPPED | OUTPATIENT
Start: 2023-10-11 | End: 2024-02-09 | Stop reason: SDUPTHER

## 2023-10-11 RX ORDER — ESCITALOPRAM OXALATE 20 MG/1
20 TABLET ORAL DAILY
Qty: 90 TABLET | Refills: 1 | Status: SHIPPED | OUTPATIENT
Start: 2023-10-11

## 2023-10-11 RX ORDER — AMLODIPINE BESYLATE 5 MG/1
5 TABLET ORAL DAILY
Qty: 90 TABLET | Refills: 1 | Status: SHIPPED | OUTPATIENT
Start: 2023-10-11

## 2023-10-11 RX ORDER — GABAPENTIN 100 MG/1
100 CAPSULE ORAL NIGHTLY
Qty: 90 CAPSULE | Refills: 1 | Status: SHIPPED | OUTPATIENT
Start: 2023-10-11

## 2023-10-11 RX ORDER — MONTELUKAST SODIUM 10 MG/1
10 TABLET ORAL DAILY
Qty: 90 TABLET | Refills: 1 | Status: SHIPPED | OUTPATIENT
Start: 2023-10-11 | End: 2024-02-14 | Stop reason: SDUPTHER

## 2023-10-11 RX ORDER — ROSUVASTATIN CALCIUM 40 MG/1
40 TABLET, COATED ORAL NIGHTLY
Qty: 90 TABLET | Refills: 1 | Status: SHIPPED | OUTPATIENT
Start: 2023-10-11

## 2023-10-11 RX ORDER — METOPROLOL TARTRATE 25 MG/1
12.5 TABLET, FILM COATED ORAL 2 TIMES DAILY
Qty: 90 TABLET | Refills: 1 | Status: SHIPPED | OUTPATIENT
Start: 2023-10-11 | End: 2024-02-09 | Stop reason: SDUPTHER

## 2023-10-11 RX ORDER — PANTOPRAZOLE SODIUM 40 MG/1
40 TABLET, DELAYED RELEASE ORAL DAILY
Qty: 90 TABLET | Refills: 1 | Status: SHIPPED | OUTPATIENT
Start: 2023-10-11

## 2023-10-11 RX ORDER — FLUTICASONE FUROATE, UMECLIDINIUM BROMIDE AND VILANTEROL TRIFENATATE 100; 62.5; 25 UG/1; UG/1; UG/1
1 POWDER RESPIRATORY (INHALATION) DAILY
Qty: 60 EACH | Refills: 5 | Status: SHIPPED | OUTPATIENT
Start: 2023-10-11 | End: 2024-02-09 | Stop reason: SDUPTHER

## 2023-10-11 RX ORDER — CLOPIDOGREL BISULFATE 75 MG/1
75 TABLET ORAL DAILY
Qty: 90 TABLET | Refills: 1 | Status: SHIPPED | OUTPATIENT
Start: 2023-10-11

## 2023-12-09 DIAGNOSIS — Z71.89 COMPLEX CARE COORDINATION: ICD-10-CM

## 2023-12-27 ENCOUNTER — OFFICE VISIT (OUTPATIENT)
Dept: FAMILY MEDICINE | Facility: CLINIC | Age: 78
End: 2023-12-27
Payer: MEDICARE

## 2023-12-27 VITALS
RESPIRATION RATE: 15 BRPM | WEIGHT: 223 LBS | TEMPERATURE: 98 F | OXYGEN SATURATION: 96 % | BODY MASS INDEX: 35 KG/M2 | HEIGHT: 67 IN | HEART RATE: 71 BPM | SYSTOLIC BLOOD PRESSURE: 142 MMHG | DIASTOLIC BLOOD PRESSURE: 84 MMHG

## 2023-12-27 DIAGNOSIS — Z23 NEED FOR IMMUNIZATION AGAINST INFLUENZA: ICD-10-CM

## 2023-12-27 DIAGNOSIS — E78.5 HYPERLIPIDEMIA, UNSPECIFIED HYPERLIPIDEMIA TYPE: ICD-10-CM

## 2023-12-27 DIAGNOSIS — N18.32 STAGE 3B CHRONIC KIDNEY DISEASE: ICD-10-CM

## 2023-12-27 DIAGNOSIS — J44.9 CHRONIC OBSTRUCTIVE PULMONARY DISEASE, UNSPECIFIED COPD TYPE: ICD-10-CM

## 2023-12-27 DIAGNOSIS — G47.30 SLEEP APNEA, UNSPECIFIED TYPE: ICD-10-CM

## 2023-12-27 DIAGNOSIS — I10 PRIMARY HYPERTENSION: Primary | ICD-10-CM

## 2023-12-27 DIAGNOSIS — R53.83 FATIGUE, UNSPECIFIED TYPE: ICD-10-CM

## 2023-12-27 PROCEDURE — G0008 ADMIN INFLUENZA VIRUS VAC: HCPCS | Mod: ,,, | Performed by: FAMILY MEDICINE

## 2023-12-27 PROCEDURE — G0008 FLU VACCINE - QUADRIVALENT - ADJUVANTED: ICD-10-PCS | Mod: ,,, | Performed by: FAMILY MEDICINE

## 2023-12-27 PROCEDURE — 99213 OFFICE O/P EST LOW 20 MIN: CPT | Mod: ,,, | Performed by: FAMILY MEDICINE

## 2023-12-27 PROCEDURE — 99213 PR OFFICE/OUTPT VISIT, EST, LEVL III, 20-29 MIN: ICD-10-PCS | Mod: ,,, | Performed by: FAMILY MEDICINE

## 2023-12-27 PROCEDURE — 90694 FLU VACCINE - QUADRIVALENT - ADJUVANTED: ICD-10-PCS | Mod: ,,, | Performed by: FAMILY MEDICINE

## 2023-12-27 PROCEDURE — 90694 VACC AIIV4 NO PRSRV 0.5ML IM: CPT | Mod: ,,, | Performed by: FAMILY MEDICINE

## 2023-12-27 NOTE — PROGRESS NOTES
Livier Romero is a 78 y.o. female seen today for follow-up on her hypertension and COPD.  Patient also has a history of sleep apnea over the last several weeks has had increased fatigue.  Her blood pressures have been elevated but she admits to being very confused about her medications.  We went over them today and we will make sure the patient has a copy of her medication list as she leaves.    Past Medical History:   Diagnosis Date    Adrenal adenoma     Anxiety     Asthma     Ataxic gait     Cerebral infarction     COPD (chronic obstructive pulmonary disease)     Depression     Hearing loss     Hyperlipidemia     Hypertension     Restless legs      Family History   Problem Relation Age of Onset    Heart disease Mother     Stroke Father     Cancer Sister     Lung cancer Maternal Grandmother      Current Outpatient Medications on File Prior to Visit   Medication Sig Dispense Refill    amLODIPine (NORVASC) 5 MG tablet Take 1 tablet (5 mg total) by mouth once daily. 90 tablet 1    aspirin 81 MG Chew Take 1 tablet (81 mg total) by mouth once daily. 90 tablet 1    busPIRone (BUSPAR) 10 MG tablet Take 1 tablet (10 mg total) by mouth 3 (three) times daily. As needed for anxiety 90 tablet 5    clopidogreL (PLAVIX) 75 mg tablet Take 1 tablet (75 mg total) by mouth once daily. 90 tablet 1    EScitalopram oxalate (LEXAPRO) 20 MG tablet Take 1 tablet (20 mg total) by mouth once daily. 90 tablet 1    gabapentin (NEURONTIN) 100 MG capsule Take 1 capsule (100 mg total) by mouth every evening. 90 capsule 1    isosorbide mononitrate (IMDUR) 30 MG 24 hr tablet Take 1 tablet (30 mg total) by mouth once daily. 90 tablet 1    lisinopriL (PRINIVIL,ZESTRIL) 40 MG tablet Take 1 tablet (40 mg total) by mouth once daily. 90 tablet 1    metoprolol tartrate (LOPRESSOR) 25 MG tablet Take 0.5 tablets (12.5 mg total) by mouth 2 (two) times daily. 90 tablet 1    montelukast (SINGULAIR) 10 mg tablet Take 1 tablet (10 mg total) by mouth once  daily. 90 tablet 1    pantoprazole (PROTONIX) 40 MG tablet Take 1 tablet (40 mg total) by mouth once daily. 90 tablet 1    rOPINIRole (REQUIP) 0.25 MG tablet Take 1 tablet (0.25 mg total) by mouth 3 (three) times daily. 180 tablet 1    rosuvastatin (CRESTOR) 40 MG Tab Take 1 tablet (40 mg total) by mouth every evening. 90 tablet 1    TRELEGY ELLIPTA 100-62.5-25 mcg DsDv Take 1 puff by mouth once daily. 60 each 5    albuterol (PROVENTIL/VENTOLIN HFA) 90 mcg/actuation inhaler Inhale 2 puffs into the lungs every 6 (six) hours as needed for Wheezing. Rescue (Patient not taking: Reported on 12/27/2023) 18 g 5    tolterodine (DETROL LA) 2 MG Cp24 Take 1 capsule (2 mg total) by mouth once daily. (Patient not taking: Reported on 12/27/2023) 30 capsule 0     No current facility-administered medications on file prior to visit.     Immunization History   Administered Date(s) Administered    Pneumococcal Conjugate - 13 Valent 01/09/2018    Pneumococcal Polysaccharide - 23 Valent 10/03/2016       Review of Systems   Constitutional:  Positive for malaise/fatigue. Negative for fever and weight loss.   Respiratory:  Positive for shortness of breath.    Cardiovascular:  Negative for chest pain and palpitations.   Gastrointestinal:  Negative for nausea and vomiting.   Musculoskeletal:  Positive for joint pain and myalgias.   Psychiatric/Behavioral:  Negative for depression.         Vitals:    12/27/23 1515   BP: (!) 167/81   Pulse: 71   Resp: 15   Temp: 97.7 °F (36.5 °C)       Physical Exam  Vitals reviewed.   Constitutional:       Appearance: Normal appearance.   HENT:      Head: Normocephalic.   Eyes:      Extraocular Movements: Extraocular movements intact.      Conjunctiva/sclera: Conjunctivae normal.      Pupils: Pupils are equal, round, and reactive to light.   Neck:      Thyroid: No thyroid mass or thyromegaly.   Cardiovascular:      Rate and Rhythm: Normal rate and regular rhythm.      Heart sounds: Normal heart sounds. No  murmur heard.     No gallop.   Pulmonary:      Effort: Pulmonary effort is normal. No respiratory distress.      Breath sounds: Decreased air movement present. Decreased breath sounds present. No wheezing or rales.   Skin:     General: Skin is warm and dry.      Coloration: Skin is not jaundiced or pale.   Neurological:      Mental Status: She is alert.   Psychiatric:         Mood and Affect: Mood normal.         Behavior: Behavior normal.         Thought Content: Thought content normal.         Judgment: Judgment normal.          Assessment and Plan  1. Primary hypertension  -     CBC Auto Differential; Future; Expected date: 12/27/2023  -     Comprehensive Metabolic Panel; Future; Expected date: 12/27/2023    2. Hyperlipidemia, unspecified hyperlipidemia type  -     Lipid Panel; Future; Expected date: 12/27/2023    3. Need for immunization against influenza  -     Influenza (FLUAD) - Quadrivalent (Adjuvanted) *Preferred* (65+) (PF)    4. Chronic obstructive pulmonary disease, unspecified COPD type  Assessment & Plan:  We will continue current plan and have the patient follow-up with any exacerbations.      5. Stage 3b chronic kidney disease  Assessment & Plan:  We will continue current plan and monitor renal function.               Return to clinic in 2 weeks with home blood pressure log.  She will come in tomorrow for fasting lab work and she did decline home health for now.    The patient has no Health Maintenance topics of status Not Due

## 2023-12-29 ENCOUNTER — OFFICE VISIT (OUTPATIENT)
Dept: FAMILY MEDICINE | Facility: CLINIC | Age: 78
End: 2023-12-29
Payer: MEDICARE

## 2023-12-29 VITALS
RESPIRATION RATE: 19 BRPM | HEART RATE: 53 BPM | SYSTOLIC BLOOD PRESSURE: 150 MMHG | OXYGEN SATURATION: 95 % | WEIGHT: 223 LBS | DIASTOLIC BLOOD PRESSURE: 68 MMHG | HEIGHT: 67 IN | TEMPERATURE: 98 F | BODY MASS INDEX: 35 KG/M2

## 2023-12-29 DIAGNOSIS — I10 PRIMARY HYPERTENSION: Primary | ICD-10-CM

## 2023-12-29 PROCEDURE — 99212 PR OFFICE/OUTPT VISIT, EST, LEVL II, 10-19 MIN: ICD-10-PCS | Mod: ,,, | Performed by: NURSE PRACTITIONER

## 2023-12-29 PROCEDURE — 99212 OFFICE O/P EST SF 10 MIN: CPT | Mod: ,,, | Performed by: NURSE PRACTITIONER

## 2023-12-29 NOTE — PROGRESS NOTES
[facility]    Chief Complaint      Chief Complaint   Patient presents with    Follow-up     2 day follow up for elevated bp; pt brought home bp device to calibrate with clinic       History of Present Illness      Livier Romero is a 78 y.o. female. She  has a past medical history of Adrenal adenoma, Anxiety, Asthma, Ataxic gait, Cerebral infarction, COPD (chronic obstructive pulmonary disease), Depression, Hearing loss, Hyperlipidemia, Hypertension, and Restless legs., who presents today for issues with BP.  Was seen by Dr. Bunch 2 days ago and told to return in 2 weeks but she walked in today.  BP was 150/68. Patient did admit during her visit Dr. Bunch that she had not been completely compliant with medications due to confusion- she was educated on which meds to take.     Past Medical History:  Past Medical History:   Diagnosis Date    Adrenal adenoma     Anxiety     Asthma     Ataxic gait     Cerebral infarction     COPD (chronic obstructive pulmonary disease)     Depression     Hearing loss     Hyperlipidemia     Hypertension     Restless legs        Past Surgical History:   has no past surgical history on file.    Social History:  Social History     Tobacco Use    Smoking status: Former     Current packs/day: 0.00     Types: Cigarettes     Quit date: 2021     Years since quittin.4    Smokeless tobacco: Never   Substance Use Topics    Alcohol use: Not Currently    Drug use: Never       I personally reviewed all past medical, surgical, and social.     Review of Systems   Constitutional:  Negative for fatigue.   Eyes:  Negative for visual disturbance.   Respiratory:  Negative for cough and shortness of breath.    Cardiovascular: Negative.    Gastrointestinal:  Negative for abdominal pain, constipation and diarrhea.   Genitourinary:  Negative for difficulty urinating.   Musculoskeletal:  Negative for gait problem.   Skin: Negative.    Neurological:  Negative for headaches.         Medications:  Outpatient Encounter Medications as of 12/29/2023   Medication Sig Dispense Refill    amLODIPine (NORVASC) 5 MG tablet Take 1 tablet (5 mg total) by mouth once daily. 90 tablet 1    busPIRone (BUSPAR) 10 MG tablet Take 1 tablet (10 mg total) by mouth 3 (three) times daily. As needed for anxiety 90 tablet 5    clopidogreL (PLAVIX) 75 mg tablet Take 1 tablet (75 mg total) by mouth once daily. 90 tablet 1    EScitalopram oxalate (LEXAPRO) 20 MG tablet Take 1 tablet (20 mg total) by mouth once daily. 90 tablet 1    gabapentin (NEURONTIN) 100 MG capsule Take 1 capsule (100 mg total) by mouth every evening. 90 capsule 1    isosorbide mononitrate (IMDUR) 30 MG 24 hr tablet Take 1 tablet (30 mg total) by mouth once daily. 90 tablet 1    lisinopriL (PRINIVIL,ZESTRIL) 40 MG tablet Take 1 tablet (40 mg total) by mouth once daily. 90 tablet 1    metoprolol tartrate (LOPRESSOR) 25 MG tablet Take 0.5 tablets (12.5 mg total) by mouth 2 (two) times daily. 90 tablet 1    montelukast (SINGULAIR) 10 mg tablet Take 1 tablet (10 mg total) by mouth once daily. 90 tablet 1    pantoprazole (PROTONIX) 40 MG tablet Take 1 tablet (40 mg total) by mouth once daily. 90 tablet 1    rOPINIRole (REQUIP) 0.25 MG tablet Take 1 tablet (0.25 mg total) by mouth 3 (three) times daily. 180 tablet 1    rosuvastatin (CRESTOR) 40 MG Tab Take 1 tablet (40 mg total) by mouth every evening. 90 tablet 1    tolterodine (DETROL LA) 2 MG Cp24 Take 1 capsule (2 mg total) by mouth once daily. 30 capsule 0    TRELEGY ELLIPTA 100-62.5-25 mcg DsDv Take 1 puff by mouth once daily. 60 each 5    albuterol (PROVENTIL/VENTOLIN HFA) 90 mcg/actuation inhaler Inhale 2 puffs into the lungs every 6 (six) hours as needed for Wheezing. Rescue (Patient not taking: Reported on 12/27/2023) 18 g 5    aspirin 81 MG Chew Take 1 tablet (81 mg total) by mouth once daily. 90 tablet 1     No facility-administered encounter medications on file as of 12/29/2023.  "      Allergies:  Review of patient's allergies indicates:   Allergen Reactions    Cisplatin Anaphylaxis    Levofloxacin Other (See Comments)     bronchospasm    Shellfish containing products     Macrobid [nitrofurantoin monohyd/m-cryst] Nausea And Vomiting       Health Maintenance:  Immunization History   Administered Date(s) Administered    Influenza (FLUAD) - Quadrivalent - Adjuvanted - PF *Preferred* (65+) 12/27/2023    Pneumococcal Conjugate - 13 Valent 01/09/2018    Pneumococcal Polysaccharide - 23 Valent 10/03/2016      Health Maintenance   Topic Date Due    Hepatitis C Screening  Never done    TETANUS VACCINE  Never done    DEXA Scan  Never done    Shingles Vaccine (1 of 2) Never done    Lipid Panel  01/04/2024        Physical Exam      Vital Signs  Temp: 98.2 °F (36.8 °C)  Temp Source: Oral  Pulse: (!) 53  Resp: 19  SpO2: 95 %  BP: (!) 150/68  BP Location: Left arm  Patient Position: Sitting  Pain Score: 0-No pain  Height and Weight  Height: 5' 7" (170.2 cm)  Weight: 101.2 kg (223 lb)  BSA (Calculated - sq m): 2.19 sq meters  BMI (Calculated): 34.9  Weight in (lb) to have BMI = 25: 159.3]    Physical Exam  Vitals and nursing note reviewed.   Constitutional:       Appearance: Normal appearance. She is well-developed.   HENT:      Head: Normocephalic.      Right Ear: Hearing normal.      Left Ear: Hearing normal.      Nose: Nose normal.   Eyes:      General: Lids are normal.      Conjunctiva/sclera: Conjunctivae normal.   Cardiovascular:      Rate and Rhythm: Normal rate and regular rhythm.      Heart sounds: Normal heart sounds.   Pulmonary:      Effort: Pulmonary effort is normal.      Breath sounds: Normal breath sounds.   Musculoskeletal:         General: Normal range of motion.      Cervical back: Normal range of motion and neck supple.      Right lower leg: No edema.      Left lower leg: No edema.   Skin:     General: Skin is warm and dry.   Neurological:      Mental Status: She is alert and oriented to " person, place, and time.      Gait: Gait is intact.   Psychiatric:         Behavior: Behavior is cooperative.          Laboratory:  CBC:  Recent Labs   Lab 06/13/22  1457 01/04/23  1522 06/26/23  1625   WBC 7.97 9.31 9.06   RBC 5.03 5.10 4.64   Hemoglobin 15.0 15.3 13.1   Hematocrit 47.2 H 50.0 H 43.3   Platelet Count 213 221 206   MCV 93.8 98.0 H 93.3   MCH 29.8 30.0 28.2   MCHC 31.8 L 30.6 L 30.3 L     CMP:  Recent Labs   Lab 06/13/22  1457 01/04/23  1522 06/26/23  1625   Glucose 94 93 88   Calcium 9.8 10.0 9.4   Albumin 3.4 L 3.7 3.6   Total Protein 7.0 7.6 6.7   Sodium 144 140 143   Potassium 3.9 3.8 3.6   CO2 28 29 27   Chloride 109 H 106 111 H   BUN 13 21 H 19 H   Alk Phos 122 139 131   ALT 18 17 12 L   AST 18 14 L 11 L   Bilirubin, Total 0.3 0.3 0.3     LIPIDS:  Recent Labs   Lab 06/13/22  1457 01/04/23  1522   TSH 1.880  --    HDL Cholesterol 46 46   Cholesterol 238 H 211 H   Triglycerides 256 H 163 H   LDL Calculated 141 132   Cholesterol/HDL Ratio (Risk Factor) 5.2 4.6   Non- 165     TSH:  Recent Labs   Lab 06/13/22  1457   TSH 1.880     A1C:        Assessment/Plan     Livier Romero is a 78 y.o.female with:     1. Primary hypertension       Patient just started taking her medications correctly 2 days ago- BP is stable- 150/68 today; Encouraged to continue monitoring and logging her BP and to f/u in 2 weeks  Diet and lifestyle modifications discussed      Total time spent face-to-face and non-face-to-face coordinating care for this encounter was:  10  min    Chronic conditions status updated as per HPI.  Other than changes above, cont current medications and maintain follow up with specialists.  Return to clinic in 2 week(s).    Deja Che, RASHEEDAP  [facility]

## 2024-01-10 ENCOUNTER — OFFICE VISIT (OUTPATIENT)
Dept: FAMILY MEDICINE | Facility: CLINIC | Age: 79
End: 2024-01-10
Payer: MEDICARE

## 2024-01-10 VITALS
TEMPERATURE: 98 F | WEIGHT: 223 LBS | DIASTOLIC BLOOD PRESSURE: 64 MMHG | HEIGHT: 67 IN | OXYGEN SATURATION: 95 % | HEART RATE: 61 BPM | BODY MASS INDEX: 35 KG/M2 | SYSTOLIC BLOOD PRESSURE: 138 MMHG | RESPIRATION RATE: 18 BRPM

## 2024-01-10 DIAGNOSIS — M25.552 PAIN OF BOTH HIP JOINTS: ICD-10-CM

## 2024-01-10 DIAGNOSIS — M25.551 PAIN OF BOTH HIP JOINTS: ICD-10-CM

## 2024-01-10 DIAGNOSIS — F33.1 MODERATE EPISODE OF RECURRENT MAJOR DEPRESSIVE DISORDER: ICD-10-CM

## 2024-01-10 DIAGNOSIS — J44.9 CHRONIC OBSTRUCTIVE PULMONARY DISEASE, UNSPECIFIED COPD TYPE: ICD-10-CM

## 2024-01-10 DIAGNOSIS — N18.32 STAGE 3B CHRONIC KIDNEY DISEASE: ICD-10-CM

## 2024-01-10 DIAGNOSIS — R14.0 ABDOMINAL BLOATING: Primary | ICD-10-CM

## 2024-01-10 PROCEDURE — 99213 OFFICE O/P EST LOW 20 MIN: CPT | Mod: ,,, | Performed by: FAMILY MEDICINE

## 2024-01-10 NOTE — PROGRESS NOTES
Livier Romero is a 78 y.o. female seen today for follow-up on her hypertension.  Her blood pressure today is excellent and she has been taking her medications appropriately.  Patient does defer home health and seems to be doing well on her own currently.  Patient has yet to come in fasting for her lab work and plans to do so within the next month.  Today she is also complaining of bilateral hip pain with decreased range of motion and would like arthritis studies added to her lab work.  Patient is also complaining of persistent abdominal bloating and abdominal gas.  Patient is not up-to-date on her colon cancer screening which may not be indicated that her age and we discussed a GI evaluation.    Past Medical History:   Diagnosis Date    Adrenal adenoma     Anxiety     Asthma     Ataxic gait     Cerebral infarction     COPD (chronic obstructive pulmonary disease)     Depression     Hearing loss     Hyperlipidemia     Hypertension     Restless legs      Family History   Problem Relation Age of Onset    Heart disease Mother     Stroke Father     Cancer Sister     Lung cancer Maternal Grandmother      Current Outpatient Medications on File Prior to Visit   Medication Sig Dispense Refill    amLODIPine (NORVASC) 5 MG tablet Take 1 tablet (5 mg total) by mouth once daily. 90 tablet 1    busPIRone (BUSPAR) 10 MG tablet Take 1 tablet (10 mg total) by mouth 3 (three) times daily. As needed for anxiety 90 tablet 5    clopidogreL (PLAVIX) 75 mg tablet Take 1 tablet (75 mg total) by mouth once daily. 90 tablet 1    EScitalopram oxalate (LEXAPRO) 20 MG tablet Take 1 tablet (20 mg total) by mouth once daily. 90 tablet 1    gabapentin (NEURONTIN) 100 MG capsule Take 1 capsule (100 mg total) by mouth every evening. 90 capsule 1    isosorbide mononitrate (IMDUR) 30 MG 24 hr tablet Take 1 tablet (30 mg total) by mouth once daily. 90 tablet 1    lisinopriL (PRINIVIL,ZESTRIL) 40 MG tablet Take 1 tablet (40 mg total) by mouth once  daily. 90 tablet 1    metoprolol tartrate (LOPRESSOR) 25 MG tablet Take 0.5 tablets (12.5 mg total) by mouth 2 (two) times daily. 90 tablet 1    montelukast (SINGULAIR) 10 mg tablet Take 1 tablet (10 mg total) by mouth once daily. 90 tablet 1    pantoprazole (PROTONIX) 40 MG tablet Take 1 tablet (40 mg total) by mouth once daily. 90 tablet 1    rOPINIRole (REQUIP) 0.25 MG tablet Take 1 tablet (0.25 mg total) by mouth 3 (three) times daily. 180 tablet 1    rosuvastatin (CRESTOR) 40 MG Tab Take 1 tablet (40 mg total) by mouth every evening. 90 tablet 1    tolterodine (DETROL LA) 2 MG Cp24 Take 1 capsule (2 mg total) by mouth once daily. 30 capsule 0    TRELEGY ELLIPTA 100-62.5-25 mcg DsDv Take 1 puff by mouth once daily. 60 each 5    albuterol (PROVENTIL/VENTOLIN HFA) 90 mcg/actuation inhaler Inhale 2 puffs into the lungs every 6 (six) hours as needed for Wheezing. Rescue (Patient not taking: Reported on 1/10/2024) 18 g 5    aspirin 81 MG Chew Take 1 tablet (81 mg total) by mouth once daily. 90 tablet 1     No current facility-administered medications on file prior to visit.     Immunization History   Administered Date(s) Administered    Influenza (FLUAD) - Quadrivalent - Adjuvanted - PF *Preferred* (65+) 12/27/2023    Pneumococcal Conjugate - 13 Valent 01/09/2018    Pneumococcal Polysaccharide - 23 Valent 10/03/2016       Review of Systems   Constitutional:  Negative for fever, malaise/fatigue and weight loss.   Respiratory:  Negative for shortness of breath.    Cardiovascular:  Negative for chest pain and palpitations.   Gastrointestinal:  Positive for abdominal pain. Negative for nausea and vomiting.   Musculoskeletal:  Positive for back pain, joint pain and myalgias.   Psychiatric/Behavioral:  Negative for depression.         Vitals:    01/10/24 1602   BP: 138/64   Pulse: 61   Resp: 18   Temp: 98.2 °F (36.8 °C)       Physical Exam  Vitals reviewed.   Eyes:      Conjunctiva/sclera: Conjunctivae normal.   Pulmonary:       Effort: Pulmonary effort is normal.   Musculoskeletal:      Right hip: Decreased range of motion.      Left hip: Decreased range of motion.      Comments: She is slow to rise from a seated position with a stooped gait.   Neurological:      Mental Status: She is alert.   Psychiatric:         Mood and Affect: Mood normal.         Behavior: Behavior normal.         Thought Content: Thought content normal.         Judgment: Judgment normal.          Assessment and Plan  1. Abdominal bloating  -     Ambulatory referral/consult to Gastroenterology; Future; Expected date: 01/17/2024    2. Pain of both hip joints  -     C-Reactive Protein; Future; Expected date: 01/17/2024  -     ROBBY EIA w/ Reflex to dsDNA/JEANE; Future; Expected date: 01/17/2024  -     Uric Acid; Future; Expected date: 01/17/2024  -     Rheumatoid Factor Screen; Future; Expected date: 01/17/2024  -     Sedimentation Rate; Future; Expected date: 01/17/2024             Return to clinic in 1 month to review her lab work or as needed.  I did recommend over-the-counter dairy relief medication as well is yogurt with active cultures b.i.d..    The patient has no Health Maintenance topics of status Not Due

## 2024-01-10 NOTE — ASSESSMENT & PLAN NOTE
We will have the patient follow-up for follow-up chemistry and renal functions and encouraged hydration and avoidance of NSAIDs.

## 2024-02-09 DIAGNOSIS — I10 PRIMARY HYPERTENSION: ICD-10-CM

## 2024-02-09 DIAGNOSIS — F41.9 ANXIETY: ICD-10-CM

## 2024-02-09 DIAGNOSIS — G25.81 RESTLESS LEG: ICD-10-CM

## 2024-02-09 DIAGNOSIS — J44.9 CHRONIC OBSTRUCTIVE PULMONARY DISEASE, UNSPECIFIED COPD TYPE: Primary | ICD-10-CM

## 2024-02-09 RX ORDER — ISOSORBIDE MONONITRATE 30 MG/1
30 TABLET, EXTENDED RELEASE ORAL DAILY
Qty: 90 TABLET | Refills: 1 | Status: SHIPPED | OUTPATIENT
Start: 2024-02-09 | End: 2024-02-14 | Stop reason: SDUPTHER

## 2024-02-09 RX ORDER — FLUTICASONE FUROATE, UMECLIDINIUM BROMIDE AND VILANTEROL TRIFENATATE 100; 62.5; 25 UG/1; UG/1; UG/1
1 POWDER RESPIRATORY (INHALATION) DAILY
Qty: 60 EACH | Refills: 5 | Status: SHIPPED | OUTPATIENT
Start: 2024-02-09 | End: 2024-02-14 | Stop reason: SDUPTHER

## 2024-02-09 RX ORDER — BUSPIRONE HYDROCHLORIDE 10 MG/1
10 TABLET ORAL 3 TIMES DAILY
Qty: 90 TABLET | Refills: 5 | Status: SHIPPED | OUTPATIENT
Start: 2024-02-09 | End: 2024-02-14 | Stop reason: SDUPTHER

## 2024-02-09 RX ORDER — ROPINIROLE 0.25 MG/1
0.25 TABLET, FILM COATED ORAL 3 TIMES DAILY
Qty: 180 TABLET | Refills: 1 | Status: SHIPPED | OUTPATIENT
Start: 2024-02-09 | End: 2024-02-14 | Stop reason: SDUPTHER

## 2024-02-09 RX ORDER — METOPROLOL TARTRATE 25 MG/1
12.5 TABLET, FILM COATED ORAL 2 TIMES DAILY
Qty: 90 TABLET | Refills: 1 | Status: SHIPPED | OUTPATIENT
Start: 2024-02-09 | End: 2024-02-12 | Stop reason: SDUPTHER

## 2024-02-12 DIAGNOSIS — I10 PRIMARY HYPERTENSION: ICD-10-CM

## 2024-02-12 RX ORDER — METOPROLOL TARTRATE 25 MG/1
12.5 TABLET, FILM COATED ORAL 2 TIMES DAILY
Qty: 90 TABLET | Refills: 1 | Status: SHIPPED | OUTPATIENT
Start: 2024-02-12 | End: 2024-02-14 | Stop reason: SDUPTHER

## 2024-02-14 ENCOUNTER — OFFICE VISIT (OUTPATIENT)
Dept: FAMILY MEDICINE | Facility: CLINIC | Age: 79
End: 2024-02-14
Payer: MEDICARE

## 2024-02-14 VITALS
BODY MASS INDEX: 35 KG/M2 | OXYGEN SATURATION: 96 % | DIASTOLIC BLOOD PRESSURE: 68 MMHG | HEART RATE: 83 BPM | HEIGHT: 67 IN | WEIGHT: 223 LBS | SYSTOLIC BLOOD PRESSURE: 127 MMHG

## 2024-02-14 DIAGNOSIS — G25.81 RESTLESS LEG: ICD-10-CM

## 2024-02-14 DIAGNOSIS — E78.2 MIXED HYPERLIPIDEMIA: ICD-10-CM

## 2024-02-14 DIAGNOSIS — J44.9 CHRONIC OBSTRUCTIVE PULMONARY DISEASE, UNSPECIFIED COPD TYPE: ICD-10-CM

## 2024-02-14 DIAGNOSIS — F41.9 ANXIETY: ICD-10-CM

## 2024-02-14 DIAGNOSIS — Z91.09 ENVIRONMENTAL ALLERGIES: ICD-10-CM

## 2024-02-14 DIAGNOSIS — I10 PRIMARY HYPERTENSION: ICD-10-CM

## 2024-02-14 DIAGNOSIS — J01.40 ACUTE NON-RECURRENT PANSINUSITIS: Primary | ICD-10-CM

## 2024-02-14 PROCEDURE — 96372 THER/PROPH/DIAG INJ SC/IM: CPT | Mod: ,,, | Performed by: FAMILY MEDICINE

## 2024-02-14 PROCEDURE — 99213 OFFICE O/P EST LOW 20 MIN: CPT | Mod: ,,, | Performed by: FAMILY MEDICINE

## 2024-02-14 RX ORDER — MONTELUKAST SODIUM 10 MG/1
10 TABLET ORAL DAILY
Qty: 90 TABLET | Refills: 1 | Status: SHIPPED | OUTPATIENT
Start: 2024-02-14 | End: 2024-05-08 | Stop reason: SDUPTHER

## 2024-02-14 RX ORDER — CEFTRIAXONE 1 G/1
1 INJECTION, POWDER, FOR SOLUTION INTRAMUSCULAR; INTRAVENOUS
Status: COMPLETED | OUTPATIENT
Start: 2024-02-14 | End: 2024-02-14

## 2024-02-14 RX ORDER — METOPROLOL TARTRATE 25 MG/1
12.5 TABLET, FILM COATED ORAL 2 TIMES DAILY
Qty: 90 TABLET | Refills: 1 | Status: SHIPPED | OUTPATIENT
Start: 2024-02-14 | End: 2024-05-08 | Stop reason: SDUPTHER

## 2024-02-14 RX ORDER — CEFUROXIME AXETIL 500 MG/1
500 TABLET ORAL EVERY 12 HOURS
Qty: 20 TABLET | Refills: 0 | Status: SHIPPED | OUTPATIENT
Start: 2024-02-14 | End: 2024-05-08 | Stop reason: ALTCHOICE

## 2024-02-14 RX ORDER — FLUTICASONE FUROATE, UMECLIDINIUM BROMIDE AND VILANTEROL TRIFENATATE 100; 62.5; 25 UG/1; UG/1; UG/1
1 POWDER RESPIRATORY (INHALATION) DAILY
Qty: 60 EACH | Refills: 5 | Status: SHIPPED | OUTPATIENT
Start: 2024-02-14

## 2024-02-14 RX ORDER — FLUTICASONE PROPIONATE 50 MCG
2 SPRAY, SUSPENSION (ML) NASAL DAILY
Qty: 16 G | Refills: 5 | Status: SHIPPED | OUTPATIENT
Start: 2024-02-14

## 2024-02-14 RX ORDER — ROPINIROLE 0.25 MG/1
0.25 TABLET, FILM COATED ORAL 3 TIMES DAILY
Qty: 180 TABLET | Refills: 1 | Status: SHIPPED | OUTPATIENT
Start: 2024-02-14 | End: 2024-05-08 | Stop reason: SDUPTHER

## 2024-02-14 RX ORDER — INCLISIRAN 284 MG/1.5ML
284 INJECTION, SOLUTION SUBCUTANEOUS
Qty: 1.5 ML | Refills: 0 | Status: SHIPPED | OUTPATIENT
Start: 2024-02-14

## 2024-02-14 RX ORDER — BUSPIRONE HYDROCHLORIDE 10 MG/1
10 TABLET ORAL 3 TIMES DAILY
Qty: 90 TABLET | Refills: 5 | Status: SHIPPED | OUTPATIENT
Start: 2024-02-14 | End: 2024-05-08 | Stop reason: SDUPTHER

## 2024-02-14 RX ORDER — ISOSORBIDE MONONITRATE 30 MG/1
30 TABLET, EXTENDED RELEASE ORAL DAILY
Qty: 90 TABLET | Refills: 1 | Status: SHIPPED | OUTPATIENT
Start: 2024-02-14 | End: 2024-05-08 | Stop reason: SDUPTHER

## 2024-02-14 RX ORDER — LISINOPRIL 40 MG/1
40 TABLET ORAL DAILY
Qty: 90 TABLET | Refills: 1 | Status: SHIPPED | OUTPATIENT
Start: 2024-02-14 | End: 2024-05-08 | Stop reason: SDUPTHER

## 2024-02-14 RX ADMIN — CEFTRIAXONE 1 G: 1 INJECTION, POWDER, FOR SOLUTION INTRAMUSCULAR; INTRAVENOUS at 04:02
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
## 2024-03-06 DIAGNOSIS — J01.40 ACUTE NON-RECURRENT PANSINUSITIS: ICD-10-CM

## 2024-03-07 RX ORDER — KETOROLAC TROMETHAMINE 30 MG/ML
15 INJECTION, SOLUTION INTRAMUSCULAR; INTRAVENOUS ONCE AS NEEDED
OUTPATIENT
Start: 2024-04-01

## 2024-03-07 RX ORDER — METHYLPREDNISOLONE SOD SUCC 125 MG
80 VIAL (EA) INJECTION
OUTPATIENT
Start: 2024-04-01

## 2024-03-07 RX ORDER — ONDANSETRON HYDROCHLORIDE 2 MG/ML
8 INJECTION, SOLUTION INTRAVENOUS ONCE AS NEEDED
OUTPATIENT
Start: 2024-04-01

## 2024-03-07 RX ORDER — METHYLPREDNISOLONE SOD SUCC 125 MG
125 VIAL (EA) INJECTION
OUTPATIENT
Start: 2024-04-01

## 2024-03-07 RX ORDER — DIPHENHYDRAMINE HYDROCHLORIDE 50 MG/ML
50 INJECTION INTRAMUSCULAR; INTRAVENOUS ONCE AS NEEDED
OUTPATIENT
Start: 2024-04-01

## 2024-03-07 RX ORDER — EPINEPHRINE 0.3 MG/.3ML
0.3 INJECTION SUBCUTANEOUS ONCE AS NEEDED
OUTPATIENT
Start: 2024-04-01

## 2024-03-07 RX ORDER — ACETAMINOPHEN 325 MG/1
650 TABLET ORAL
OUTPATIENT
Start: 2024-04-01

## 2024-05-08 ENCOUNTER — OFFICE VISIT (OUTPATIENT)
Dept: FAMILY MEDICINE | Facility: CLINIC | Age: 79
End: 2024-05-08
Payer: MEDICARE

## 2024-05-08 VITALS
HEART RATE: 63 BPM | TEMPERATURE: 98 F | HEIGHT: 67 IN | DIASTOLIC BLOOD PRESSURE: 60 MMHG | RESPIRATION RATE: 18 BRPM | SYSTOLIC BLOOD PRESSURE: 122 MMHG | WEIGHT: 222 LBS | OXYGEN SATURATION: 96 % | BODY MASS INDEX: 34.84 KG/M2

## 2024-05-08 DIAGNOSIS — Z78.0 ENCOUNTER FOR OSTEOPOROSIS SCREENING IN ASYMPTOMATIC POSTMENOPAUSAL PATIENT: ICD-10-CM

## 2024-05-08 DIAGNOSIS — N28.9 RENAL INSUFFICIENCY: Primary | ICD-10-CM

## 2024-05-08 DIAGNOSIS — Z13.820 ENCOUNTER FOR OSTEOPOROSIS SCREENING IN ASYMPTOMATIC POSTMENOPAUSAL PATIENT: ICD-10-CM

## 2024-05-08 DIAGNOSIS — M25.572 CHRONIC PAIN OF LEFT ANKLE: ICD-10-CM

## 2024-05-08 DIAGNOSIS — E78.2 MIXED HYPERLIPIDEMIA: ICD-10-CM

## 2024-05-08 DIAGNOSIS — R32 INCONTINENCE IN FEMALE: ICD-10-CM

## 2024-05-08 DIAGNOSIS — I10 PRIMARY HYPERTENSION: ICD-10-CM

## 2024-05-08 DIAGNOSIS — F33.1 MODERATE EPISODE OF RECURRENT MAJOR DEPRESSIVE DISORDER: ICD-10-CM

## 2024-05-08 DIAGNOSIS — G89.29 CHRONIC PAIN OF LEFT ANKLE: ICD-10-CM

## 2024-05-08 DIAGNOSIS — Z91.09 ENVIRONMENTAL ALLERGIES: ICD-10-CM

## 2024-05-08 DIAGNOSIS — I63.89 CEREBROVASCULAR ACCIDENT (CVA) DUE TO OTHER MECHANISM: ICD-10-CM

## 2024-05-08 DIAGNOSIS — G25.81 RESTLESS LEG: ICD-10-CM

## 2024-05-08 DIAGNOSIS — F41.9 ANXIETY: ICD-10-CM

## 2024-05-08 DIAGNOSIS — K21.9 GASTROESOPHAGEAL REFLUX DISEASE WITHOUT ESOPHAGITIS: ICD-10-CM

## 2024-05-08 LAB
ANION GAP SERPL CALCULATED.3IONS-SCNC: 8 MMOL/L (ref 7–16)
BUN SERPL-MCNC: 24 MG/DL (ref 7–18)
BUN/CREAT SERPL: 20 (ref 6–20)
CALCIUM SERPL-MCNC: 10.1 MG/DL (ref 8.5–10.1)
CHLORIDE SERPL-SCNC: 110 MMOL/L (ref 98–107)
CO2 SERPL-SCNC: 30 MMOL/L (ref 21–32)
CREAT SERPL-MCNC: 1.21 MG/DL (ref 0.55–1.02)
EGFR (NO RACE VARIABLE) (RUSH/TITUS): 46 ML/MIN/1.73M2
GLUCOSE SERPL-MCNC: 101 MG/DL (ref 74–106)
POTASSIUM SERPL-SCNC: 4.6 MMOL/L (ref 3.5–5.1)
SODIUM SERPL-SCNC: 143 MMOL/L (ref 136–145)

## 2024-05-08 PROCEDURE — 99213 OFFICE O/P EST LOW 20 MIN: CPT | Mod: ,,, | Performed by: FAMILY MEDICINE

## 2024-05-08 PROCEDURE — 80048 BASIC METABOLIC PNL TOTAL CA: CPT | Mod: ,,, | Performed by: CLINICAL MEDICAL LABORATORY

## 2024-05-08 PROCEDURE — 96372 THER/PROPH/DIAG INJ SC/IM: CPT | Mod: ,,, | Performed by: FAMILY MEDICINE

## 2024-05-08 RX ORDER — LISINOPRIL 40 MG/1
40 TABLET ORAL DAILY
Qty: 90 TABLET | Refills: 1 | Status: SHIPPED | OUTPATIENT
Start: 2024-05-08

## 2024-05-08 RX ORDER — MONTELUKAST SODIUM 10 MG/1
10 TABLET ORAL DAILY
Qty: 90 TABLET | Refills: 1 | Status: SHIPPED | OUTPATIENT
Start: 2024-05-08

## 2024-05-08 RX ORDER — GABAPENTIN 100 MG/1
100 CAPSULE ORAL NIGHTLY
Qty: 90 CAPSULE | Refills: 1 | Status: SHIPPED | OUTPATIENT
Start: 2024-05-08

## 2024-05-08 RX ORDER — CLOPIDOGREL BISULFATE 75 MG/1
75 TABLET ORAL DAILY
Qty: 90 TABLET | Refills: 1 | Status: SHIPPED | OUTPATIENT
Start: 2024-05-08

## 2024-05-08 RX ORDER — METHYLPREDNISOLONE SOD SUCC 125 MG
125 VIAL (EA) INJECTION
OUTPATIENT
Start: 2024-06-01

## 2024-05-08 RX ORDER — BUSPIRONE HYDROCHLORIDE 10 MG/1
10 TABLET ORAL 3 TIMES DAILY
Qty: 90 TABLET | Refills: 5 | Status: SHIPPED | OUTPATIENT
Start: 2024-05-08

## 2024-05-08 RX ORDER — DIPHENHYDRAMINE HYDROCHLORIDE 50 MG/ML
50 INJECTION INTRAMUSCULAR; INTRAVENOUS ONCE AS NEEDED
OUTPATIENT
Start: 2024-06-01

## 2024-05-08 RX ORDER — PANTOPRAZOLE SODIUM 40 MG/1
40 TABLET, DELAYED RELEASE ORAL DAILY
Qty: 90 TABLET | Refills: 1 | Status: SHIPPED | OUTPATIENT
Start: 2024-05-08

## 2024-05-08 RX ORDER — KETOROLAC TROMETHAMINE 30 MG/ML
15 INJECTION, SOLUTION INTRAMUSCULAR; INTRAVENOUS ONCE AS NEEDED
OUTPATIENT
Start: 2024-06-01

## 2024-05-08 RX ORDER — AMLODIPINE BESYLATE 5 MG/1
5 TABLET ORAL DAILY
Qty: 90 TABLET | Refills: 1 | Status: SHIPPED | OUTPATIENT
Start: 2024-05-08

## 2024-05-08 RX ORDER — ROSUVASTATIN CALCIUM 40 MG/1
40 TABLET, COATED ORAL NIGHTLY
Qty: 90 TABLET | Refills: 1 | Status: SHIPPED | OUTPATIENT
Start: 2024-05-08

## 2024-05-08 RX ORDER — EPINEPHRINE 0.3 MG/.3ML
0.3 INJECTION SUBCUTANEOUS ONCE AS NEEDED
OUTPATIENT
Start: 2024-06-01

## 2024-05-08 RX ORDER — ACETAMINOPHEN 325 MG/1
650 TABLET ORAL
OUTPATIENT
Start: 2024-06-01

## 2024-05-08 RX ORDER — ROPINIROLE 0.25 MG/1
0.25 TABLET, FILM COATED ORAL 3 TIMES DAILY
Qty: 180 TABLET | Refills: 1 | Status: SHIPPED | OUTPATIENT
Start: 2024-05-08

## 2024-05-08 RX ORDER — ONDANSETRON HYDROCHLORIDE 2 MG/ML
8 INJECTION, SOLUTION INTRAVENOUS ONCE AS NEEDED
OUTPATIENT
Start: 2024-06-01

## 2024-05-08 RX ORDER — ISOSORBIDE MONONITRATE 30 MG/1
30 TABLET, EXTENDED RELEASE ORAL DAILY
Qty: 90 TABLET | Refills: 1 | Status: SHIPPED | OUTPATIENT
Start: 2024-05-08

## 2024-05-08 RX ORDER — METOPROLOL TARTRATE 25 MG/1
12.5 TABLET, FILM COATED ORAL 2 TIMES DAILY
Qty: 90 TABLET | Refills: 1 | Status: SHIPPED | OUTPATIENT
Start: 2024-05-08

## 2024-05-08 RX ORDER — ESCITALOPRAM OXALATE 20 MG/1
20 TABLET ORAL DAILY
Qty: 90 TABLET | Refills: 1 | Status: SHIPPED | OUTPATIENT
Start: 2024-05-08

## 2024-05-08 RX ORDER — BETAMETHASONE SODIUM PHOSPHATE AND BETAMETHASONE ACETATE 3; 3 MG/ML; MG/ML
6 INJECTION, SUSPENSION INTRA-ARTICULAR; INTRALESIONAL; INTRAMUSCULAR; SOFT TISSUE
Status: COMPLETED | OUTPATIENT
Start: 2024-05-08 | End: 2024-05-08

## 2024-05-08 RX ORDER — METHYLPREDNISOLONE SOD SUCC 125 MG
80 VIAL (EA) INJECTION
OUTPATIENT
Start: 2024-06-01

## 2024-05-08 RX ADMIN — BETAMETHASONE SODIUM PHOSPHATE AND BETAMETHASONE ACETATE 6 MG: 3; 3 INJECTION, SUSPENSION INTRA-ARTICULAR; INTRALESIONAL; INTRAMUSCULAR; SOFT TISSUE at 03:05

## 2024-05-08 NOTE — PROGRESS NOTES
Livier Romero is a 79 y.o. female seen today for medication refills and follow-up on her hyperlipidemia.  Unfortunately patient has not yet been started on leqvio and we will check with the provider to see what the hold up is.  Patient does have worsening symptoms of urinary incontinence and we discussed a gyn evaluation.  She also had some mild renal insufficiency and we will recheck her renal function today.  The patient also has chronic swelling and pain involving her left ankle and is requesting a steroid injection today.     Past Medical History:   Diagnosis Date    Adrenal adenoma     Anxiety     Asthma     Ataxic gait     Cerebral infarction     COPD (chronic obstructive pulmonary disease)     Depression     Hearing loss     Hyperlipidemia     Hypertension     Restless legs      Family History   Problem Relation Name Age of Onset    Heart disease Mother      Stroke Father      Cancer Sister      Lung cancer Maternal Grandmother       Current Outpatient Medications on File Prior to Visit   Medication Sig Dispense Refill    albuterol (PROVENTIL/VENTOLIN HFA) 90 mcg/actuation inhaler Inhale 2 puffs into the lungs every 6 (six) hours as needed for Wheezing. Rescue 18 g 5    fluticasone propionate (FLONASE) 50 mcg/actuation nasal spray 2 sprays (100 mcg total) by Each Nostril route once daily. 16 g 5    TRELEGY ELLIPTA 100-62.5-25 mcg DsDv Take 1 puff by mouth once daily. 60 each 5    [DISCONTINUED] amLODIPine (NORVASC) 5 MG tablet Take 1 tablet (5 mg total) by mouth once daily. 90 tablet 1    [DISCONTINUED] busPIRone (BUSPAR) 10 MG tablet Take 1 tablet (10 mg total) by mouth 3 (three) times daily. As needed for anxiety 90 tablet 5    [DISCONTINUED] clopidogreL (PLAVIX) 75 mg tablet Take 1 tablet (75 mg total) by mouth once daily. 90 tablet 1    [DISCONTINUED] EScitalopram oxalate (LEXAPRO) 20 MG tablet Take 1 tablet (20 mg total) by mouth once daily. 90 tablet 1    [DISCONTINUED] gabapentin (NEURONTIN) 100 MG  capsule Take 1 capsule (100 mg total) by mouth every evening. 90 capsule 1    [DISCONTINUED] isosorbide mononitrate (IMDUR) 30 MG 24 hr tablet Take 1 tablet (30 mg total) by mouth once daily. 90 tablet 1    [DISCONTINUED] lisinopriL (PRINIVIL,ZESTRIL) 40 MG tablet Take 1 tablet (40 mg total) by mouth once daily. 90 tablet 1    [DISCONTINUED] metoprolol tartrate (LOPRESSOR) 25 MG tablet Take 0.5 tablets (12.5 mg total) by mouth 2 (two) times daily. 90 tablet 1    [DISCONTINUED] montelukast (SINGULAIR) 10 mg tablet Take 1 tablet (10 mg total) by mouth once daily. 90 tablet 1    [DISCONTINUED] pantoprazole (PROTONIX) 40 MG tablet Take 1 tablet (40 mg total) by mouth once daily. 90 tablet 1    [DISCONTINUED] rOPINIRole (REQUIP) 0.25 MG tablet Take 1 tablet (0.25 mg total) by mouth 3 (three) times daily. 180 tablet 1    [DISCONTINUED] rosuvastatin (CRESTOR) 40 MG Tab Take 1 tablet (40 mg total) by mouth every evening. 90 tablet 1    aspirin 81 MG Chew Take 1 tablet (81 mg total) by mouth once daily. 90 tablet 1    inclisiran (LEQVIO) 284 mg/1.5 mL Syrg subcutaneous injection Inject 1.5 mLs (284 mg total) into the skin every 3 (three) months. 1.5 mL 0    tolterodine (DETROL LA) 2 MG Cp24 Take 1 capsule (2 mg total) by mouth once daily. (Patient not taking: Reported on 5/8/2024) 30 capsule 0    [DISCONTINUED] cefUROXime (CEFTIN) 500 MG tablet Take 1 tablet (500 mg total) by mouth every 12 (twelve) hours. (Patient not taking: Reported on 5/8/2024) 20 tablet 0     No current facility-administered medications on file prior to visit.     Immunization History   Administered Date(s) Administered    Influenza (FLUAD) - Quadrivalent - Adjuvanted - PF *Preferred* (65+) 12/27/2023    Pneumococcal Conjugate - 13 Valent 01/09/2018    Pneumococcal Polysaccharide - 23 Valent 10/03/2016       Review of Systems   Constitutional:  Negative for fever, malaise/fatigue and weight loss.   Respiratory:  Positive for shortness of breath.     Cardiovascular:  Negative for chest pain and palpitations.   Gastrointestinal:  Negative for nausea and vomiting.   Musculoskeletal:  Positive for joint pain.   Psychiatric/Behavioral:  Negative for depression.         Vitals:    05/08/24 1508   BP: 122/60   Pulse: 63   Resp: 18   Temp: 98.3 °F (36.8 °C)       Physical Exam  Vitals reviewed.   Constitutional:       Appearance: Normal appearance.   HENT:      Head: Normocephalic.   Eyes:      Extraocular Movements: Extraocular movements intact.      Conjunctiva/sclera: Conjunctivae normal.      Pupils: Pupils are equal, round, and reactive to light.   Neck:      Thyroid: No thyroid mass or thyromegaly.   Cardiovascular:      Rate and Rhythm: Normal rate and regular rhythm.      Heart sounds: Normal heart sounds. No murmur heard.     No gallop.   Pulmonary:      Effort: Pulmonary effort is normal. No respiratory distress.      Breath sounds: Normal breath sounds. No wheezing or rales.   Musculoskeletal:      Right ankle: Swelling present. Tenderness present. Decreased range of motion.   Skin:     General: Skin is warm and dry.      Coloration: Skin is not jaundiced or pale.   Neurological:      Mental Status: She is alert.   Psychiatric:         Mood and Affect: Mood normal.         Behavior: Behavior normal.         Thought Content: Thought content normal.         Judgment: Judgment normal.          Assessment and Plan  1. Renal insufficiency  -     Basic Metabolic Panel; Future; Expected date: 05/08/2024    2. Encounter for osteoporosis screening in asymptomatic postmenopausal patient  -     DXA Bone Density Axial Skeleton 1 or more sites; Future; Expected date: 05/08/2024    3. Restless leg  -     rOPINIRole (REQUIP) 0.25 MG tablet; Take 1 tablet (0.25 mg total) by mouth 3 (three) times daily.  Dispense: 180 tablet; Refill: 1  -     gabapentin (NEURONTIN) 100 MG capsule; Take 1 capsule (100 mg total) by mouth every evening.  Dispense: 90 capsule; Refill: 1    4.  Anxiety  -     busPIRone (BUSPAR) 10 MG tablet; Take 1 tablet (10 mg total) by mouth 3 (three) times daily. As needed for anxiety  Dispense: 90 tablet; Refill: 5    5. Environmental allergies  -     montelukast (SINGULAIR) 10 mg tablet; Take 1 tablet (10 mg total) by mouth once daily.  Dispense: 90 tablet; Refill: 1    6. Primary hypertension  -     metoprolol tartrate (LOPRESSOR) 25 MG tablet; Take 0.5 tablets (12.5 mg total) by mouth 2 (two) times daily.  Dispense: 90 tablet; Refill: 1  -     isosorbide mononitrate (IMDUR) 30 MG 24 hr tablet; Take 1 tablet (30 mg total) by mouth once daily.  Dispense: 90 tablet; Refill: 1  -     lisinopriL (PRINIVIL,ZESTRIL) 40 MG tablet; Take 1 tablet (40 mg total) by mouth once daily.  Dispense: 90 tablet; Refill: 1  -     amLODIPine (NORVASC) 5 MG tablet; Take 1 tablet (5 mg total) by mouth once daily.  Dispense: 90 tablet; Refill: 1    7. Moderate episode of recurrent major depressive disorder  -     EScitalopram oxalate (LEXAPRO) 20 MG tablet; Take 1 tablet (20 mg total) by mouth once daily.  Dispense: 90 tablet; Refill: 1    8. Gastroesophageal reflux disease without esophagitis  -     pantoprazole (PROTONIX) 40 MG tablet; Take 1 tablet (40 mg total) by mouth once daily.  Dispense: 90 tablet; Refill: 1    9. Mixed hyperlipidemia  -     rosuvastatin (CRESTOR) 40 MG Tab; Take 1 tablet (40 mg total) by mouth every evening.  Dispense: 90 tablet; Refill: 1    10. Cerebrovascular accident (CVA) due to other mechanism  -     clopidogreL (PLAVIX) 75 mg tablet; Take 1 tablet (75 mg total) by mouth once daily.  Dispense: 90 tablet; Refill: 1             Return to clinic in three months or as needed for follow-up on her hyperlipidemia.    Health Maintenance Topics with due status: Not Due       Topic Last Completion Date    Lipid Panel 02/12/2024

## 2024-05-10 ENCOUNTER — TELEPHONE (OUTPATIENT)
Dept: FAMILY MEDICINE | Facility: CLINIC | Age: 79
End: 2024-05-10
Payer: MEDICARE

## 2024-05-10 DIAGNOSIS — N28.9 RENAL INSUFFICIENCY: Primary | ICD-10-CM

## 2024-05-10 NOTE — TELEPHONE ENCOUNTER
Patients daughter notified that pts kidney function has not improved and that Dr Bunch recommends referral to nephrology. She voiced understanding and agreed.

## 2024-05-10 NOTE — TELEPHONE ENCOUNTER
----- Message from Pa Bunch MD sent at 5/9/2024  7:49 AM CDT -----  Renal function has not significantly improved and I recommend an evaluation by Nephrology.

## 2024-05-15 ENCOUNTER — HOSPITAL ENCOUNTER (OUTPATIENT)
Dept: RADIOLOGY | Facility: HOSPITAL | Age: 79
Discharge: HOME | End: 2024-05-15
Attending: FAMILY MEDICINE
Payer: MEDICARE

## 2024-05-15 DIAGNOSIS — Z78.0 ENCOUNTER FOR OSTEOPOROSIS SCREENING IN ASYMPTOMATIC POSTMENOPAUSAL PATIENT: ICD-10-CM

## 2024-05-15 DIAGNOSIS — Z13.820 ENCOUNTER FOR OSTEOPOROSIS SCREENING IN ASYMPTOMATIC POSTMENOPAUSAL PATIENT: ICD-10-CM

## 2024-05-15 PROCEDURE — 77080 DXA BONE DENSITY AXIAL: CPT | Mod: 26,,, | Performed by: RADIOLOGY

## 2024-05-15 PROCEDURE — 77080 DXA BONE DENSITY AXIAL: CPT | Mod: TC

## 2024-05-24 ENCOUNTER — TELEPHONE (OUTPATIENT)
Dept: FAMILY MEDICINE | Facility: CLINIC | Age: 79
End: 2024-05-24
Payer: MEDICARE

## 2024-05-24 NOTE — TELEPHONE ENCOUNTER
----- Message from Pa Bunch MD sent at 5/15/2024  3:41 PM CDT -----  Patient has osteoporosis.  I recommend 1200 mg of calcium daily 600 units of vitamin-D daily and please set her up with Dr. eKen

## 2024-05-29 ENCOUNTER — TELEPHONE (OUTPATIENT)
Dept: FAMILY MEDICINE | Facility: CLINIC | Age: 79
End: 2024-05-29
Payer: MEDICARE

## 2024-05-29 DIAGNOSIS — M81.0 OSTEOPOROSIS, UNSPECIFIED OSTEOPOROSIS TYPE, UNSPECIFIED PATHOLOGICAL FRACTURE PRESENCE: Primary | ICD-10-CM

## 2024-05-29 NOTE — TELEPHONE ENCOUNTER
Pt daughter notified that pt's dexa showed osteoporosis and that Dr Bunch recommends eval by ELLA Keen and to start otc 1200mg of calcium and 600 units of vit d daily, she voiced understanding.

## 2024-05-29 NOTE — TELEPHONE ENCOUNTER
----- Message from Pa Bunch MD sent at 5/15/2024  3:41 PM CDT -----  Patient has osteoporosis.  I recommend 1200 mg of calcium daily 600 units of vitamin-D daily and please set her up with Dr. Keen

## 2024-07-09 DIAGNOSIS — Z71.89 COMPLEX CARE COORDINATION: ICD-10-CM

## 2024-08-02 DIAGNOSIS — J44.9 CHRONIC OBSTRUCTIVE PULMONARY DISEASE, UNSPECIFIED COPD TYPE: ICD-10-CM

## 2024-08-02 RX ORDER — FLUTICASONE FUROATE, UMECLIDINIUM BROMIDE AND VILANTEROL TRIFENATATE 100; 62.5; 25 UG/1; UG/1; UG/1
1 POWDER RESPIRATORY (INHALATION) DAILY
Qty: 60 EACH | Refills: 5 | Status: SHIPPED | OUTPATIENT
Start: 2024-08-02

## 2024-08-08 ENCOUNTER — OFFICE VISIT (OUTPATIENT)
Dept: FAMILY MEDICINE | Facility: CLINIC | Age: 79
End: 2024-08-08
Payer: MEDICARE

## 2024-08-08 VITALS
SYSTOLIC BLOOD PRESSURE: 136 MMHG | HEIGHT: 67 IN | TEMPERATURE: 98 F | DIASTOLIC BLOOD PRESSURE: 76 MMHG | OXYGEN SATURATION: 95 % | HEART RATE: 61 BPM | RESPIRATION RATE: 18 BRPM | WEIGHT: 222 LBS | BODY MASS INDEX: 34.84 KG/M2

## 2024-08-08 DIAGNOSIS — J44.9 CHRONIC OBSTRUCTIVE PULMONARY DISEASE, UNSPECIFIED COPD TYPE: ICD-10-CM

## 2024-08-08 DIAGNOSIS — F33.1 MODERATE EPISODE OF RECURRENT MAJOR DEPRESSIVE DISORDER: ICD-10-CM

## 2024-08-08 PROCEDURE — 99213 OFFICE O/P EST LOW 20 MIN: CPT | Mod: ,,, | Performed by: FAMILY MEDICINE

## 2024-08-08 RX ORDER — FLUTICASONE FUROATE, UMECLIDINIUM BROMIDE AND VILANTEROL TRIFENATATE 100; 62.5; 25 UG/1; UG/1; UG/1
1 POWDER RESPIRATORY (INHALATION) DAILY
Qty: 60 EACH | Refills: 5 | Status: SHIPPED | OUTPATIENT
Start: 2024-08-08

## 2024-08-08 RX ORDER — ESCITALOPRAM OXALATE 20 MG/1
20 TABLET ORAL DAILY
Qty: 90 TABLET | Refills: 1 | Status: SHIPPED | OUTPATIENT
Start: 2024-08-08

## 2024-08-12 DIAGNOSIS — E78.5 HYPERLIPIDEMIA, UNSPECIFIED HYPERLIPIDEMIA TYPE: ICD-10-CM

## 2024-08-12 DIAGNOSIS — E78.2 MIXED HYPERLIPIDEMIA: Primary | ICD-10-CM

## 2024-08-12 DIAGNOSIS — I63.89 CEREBROVASCULAR ACCIDENT (CVA) DUE TO OTHER MECHANISM: ICD-10-CM

## 2024-08-12 RX ORDER — ONDANSETRON HYDROCHLORIDE 2 MG/ML
8 INJECTION, SOLUTION INTRAVENOUS ONCE AS NEEDED
OUTPATIENT
Start: 2024-09-01

## 2024-08-12 RX ORDER — DIPHENHYDRAMINE HYDROCHLORIDE 50 MG/ML
50 INJECTION INTRAMUSCULAR; INTRAVENOUS ONCE AS NEEDED
OUTPATIENT
Start: 2024-09-01

## 2024-08-12 RX ORDER — KETOROLAC TROMETHAMINE 30 MG/ML
15 INJECTION, SOLUTION INTRAMUSCULAR; INTRAVENOUS ONCE AS NEEDED
OUTPATIENT
Start: 2024-09-01

## 2024-08-12 RX ORDER — ACETAMINOPHEN 325 MG/1
650 TABLET ORAL EVERY 6 HOURS PRN
OUTPATIENT
Start: 2024-09-01

## 2024-08-12 RX ORDER — EPINEPHRINE 0.3 MG/.3ML
0.3 INJECTION SUBCUTANEOUS ONCE AS NEEDED
OUTPATIENT
Start: 2024-09-01

## 2024-08-12 RX ORDER — METHYLPREDNISOLONE SOD SUCC 125 MG
125 VIAL (EA) INJECTION
OUTPATIENT
Start: 2024-09-01

## 2024-08-12 RX ORDER — METHYLPREDNISOLONE SOD SUCC 125 MG
80 VIAL (EA) INJECTION ONCE
OUTPATIENT
Start: 2024-09-01 | End: 2024-09-01

## 2024-08-15 ENCOUNTER — TELEPHONE (OUTPATIENT)
Dept: INFUSION THERAPY | Facility: HOSPITAL | Age: 79
End: 2024-08-15
Payer: MEDICARE

## 2024-08-15 NOTE — TELEPHONE ENCOUNTER
Unable to reach pt to schedule Leqvio, multiple attempts made and voicemail's left, providers office made aware.

## 2024-08-21 ENCOUNTER — PATIENT MESSAGE (OUTPATIENT)
Dept: INFUSION THERAPY | Facility: HOSPITAL | Age: 79
End: 2024-08-21
Payer: MEDICARE

## 2024-09-16 ENCOUNTER — OFFICE VISIT (OUTPATIENT)
Dept: OBSTETRICS AND GYNECOLOGY | Facility: CLINIC | Age: 79
End: 2024-09-16
Payer: MEDICARE

## 2024-09-16 VITALS — DIASTOLIC BLOOD PRESSURE: 62 MMHG | HEART RATE: 62 BPM | SYSTOLIC BLOOD PRESSURE: 149 MMHG

## 2024-09-16 DIAGNOSIS — R32 INCONTINENCE IN FEMALE: ICD-10-CM

## 2024-09-16 PROCEDURE — 99999 PR PBB SHADOW E&M-EST. PATIENT-LVL III: CPT | Mod: PBBFAC,,, | Performed by: STUDENT IN AN ORGANIZED HEALTH CARE EDUCATION/TRAINING PROGRAM

## 2024-09-16 PROCEDURE — 99203 OFFICE O/P NEW LOW 30 MIN: CPT | Mod: S$PBB,,, | Performed by: STUDENT IN AN ORGANIZED HEALTH CARE EDUCATION/TRAINING PROGRAM

## 2024-09-16 PROCEDURE — 99213 OFFICE O/P EST LOW 20 MIN: CPT | Mod: PBBFAC | Performed by: STUDENT IN AN ORGANIZED HEALTH CARE EDUCATION/TRAINING PROGRAM

## 2024-09-23 NOTE — PROGRESS NOTES
Gynecology History and Physical    Assessment/Plan:   Problem List Items Addressed This Visit    None  Visit Diagnoses       Incontinence in female                  CC:   Chief Complaint   Patient presents with    Gynecologic Exam     Initial Gyn visit--reports np gyn problems, but voices uncontrolled urine problems     HPI: Livier Romero is a 79 y.o. female who presents with complaints of urinary incontinence. S/p hysterectomy.  Reports leakage with coughing, sneezing. Also reports incontinence without exertion.    Review of Systems: The following ROS was otherwise negative, except as noted in the HPI:  constitutional, HEENT, respiratory, cardiovascular, gastrointestinal, genitourinary, skin, musculoskeletal, neurological, psych    Gynecologic History:   denies history of abnormal pap smears  denies history of of STIs  Menstrual history:       Obstetrical History:  OB History    No obstetric history on file.         Past Medical History:   Past Medical History:   Diagnosis Date    Adrenal adenoma     Anxiety     Asthma     Ataxic gait     Cerebral infarction     COPD (chronic obstructive pulmonary disease)     Depression     Hearing loss     Hyperlipidemia     Hypertension     Restless legs        Medications:  Medication List with Changes/Refills   Current Medications    ALBUTEROL (PROVENTIL/VENTOLIN HFA) 90 MCG/ACTUATION INHALER    Inhale 2 puffs into the lungs every 6 (six) hours as needed for Wheezing. Rescue    AMLODIPINE (NORVASC) 5 MG TABLET    Take 1 tablet (5 mg total) by mouth once daily.    ASPIRIN 81 MG CHEW    Take 1 tablet (81 mg total) by mouth once daily.    BUSPIRONE (BUSPAR) 10 MG TABLET    Take 1 tablet (10 mg total) by mouth 3 (three) times daily. As needed for anxiety    CLOPIDOGREL (PLAVIX) 75 MG TABLET    Take 1 tablet (75 mg total) by mouth once daily.    ESCITALOPRAM OXALATE (LEXAPRO) 20 MG TABLET    Take 1 tablet (20 mg total) by mouth once daily.    FLUTICASONE PROPIONATE (FLONASE) 50  MCG/ACTUATION NASAL SPRAY    2 sprays (100 mcg total) by Each Nostril route once daily.    GABAPENTIN (NEURONTIN) 100 MG CAPSULE    Take 1 capsule (100 mg total) by mouth every evening.    ISOSORBIDE MONONITRATE (IMDUR) 30 MG 24 HR TABLET    Take 1 tablet (30 mg total) by mouth once daily.    LISINOPRIL (PRINIVIL,ZESTRIL) 40 MG TABLET    Take 1 tablet (40 mg total) by mouth once daily.    METOPROLOL TARTRATE (LOPRESSOR) 25 MG TABLET    Take 0.5 tablets (12.5 mg total) by mouth 2 (two) times daily.    MONTELUKAST (SINGULAIR) 10 MG TABLET    Take 1 tablet (10 mg total) by mouth once daily.    PANTOPRAZOLE (PROTONIX) 40 MG TABLET    Take 1 tablet (40 mg total) by mouth once daily.    ROPINIROLE (REQUIP) 0.25 MG TABLET    Take 1 tablet (0.25 mg total) by mouth 3 (three) times daily.    ROSUVASTATIN (CRESTOR) 40 MG TAB    Take 1 tablet (40 mg total) by mouth every evening.    TOLTERODINE (DETROL LA) 2 MG CP24    Take 1 capsule (2 mg total) by mouth once daily.    TRELEGY ELLIPTA 100-62.5-25 MCG DSDV    Take 1 puff by mouth once daily.       Allergies:  Cisplatin, Levofloxacin, Shellfish containing products, and Macrobid [nitrofurantoin monohyd/m-cryst]    Surgical History:  Past Surgical History:   Procedure Laterality Date    HYSTERECTOMY         Family History:  Family History   Problem Relation Name Age of Onset    Heart disease Mother      Stroke Father      Cancer Sister      Lung cancer Maternal Grandmother         Social History:  Social History     Substance and Sexual Activity   Alcohol Use Not Currently     Social History     Substance and Sexual Activity   Drug Use Never     Social History     Tobacco Use   Smoking Status Every Day    Current packs/day: 1.00    Average packs/day: 1 pack/day for 40.7 years (40.7 ttl pk-yrs)    Types: Cigarettes    Start date: 1984    Passive exposure: Current   Smokeless Tobacco Never       Physical Exam:  BP (!) 149/62 (BP Location: Right arm, Patient Position: Sitting)    Pulse 62     General: Alert, well appearing, no acute distress  Head: Normocephalic, atraumatic  Lungs: Unlabored respirations  Abdomen: Soft, nontender, nondistended   Pelvic: deferred  Extremities: No redness or tenderness  Skin: Well perfused, normal coloration and turgor, no lesions or rashes visualized  Neuro: Alert, oriented, normal speech, no focal deficits, moves extremities appropriately  Osteopathic: No TART changes    Labs:  No visits with results within 1 Day(s) from this visit.   Latest known visit with results is:   Office Visit on 05/08/2024   Component Date Value    Sodium 05/08/2024 143     Potassium 05/08/2024 4.6     Chloride 05/08/2024 110 (H)     CO2 05/08/2024 30     Anion Gap 05/08/2024 8     Glucose 05/08/2024 101     BUN 05/08/2024 24 (H)     Creatinine 05/08/2024 1.21 (H)     BUN/Creatinine Ratio 05/08/2024 20     Calcium 05/08/2024 10.1     eGFR 05/08/2024 46 (L)      Discussed management options, she is interested in surgery but does not want to leave [place] for procedure  Will continue with conservative management at this time

## 2024-10-01 ENCOUNTER — OFFICE VISIT (OUTPATIENT)
Dept: FAMILY MEDICINE | Facility: CLINIC | Age: 79
End: 2024-10-01
Payer: MEDICARE

## 2024-10-01 VITALS
RESPIRATION RATE: 19 BRPM | OXYGEN SATURATION: 95 % | WEIGHT: 221 LBS | BODY MASS INDEX: 34.69 KG/M2 | DIASTOLIC BLOOD PRESSURE: 62 MMHG | HEIGHT: 67 IN | TEMPERATURE: 99 F | SYSTOLIC BLOOD PRESSURE: 128 MMHG | HEART RATE: 64 BPM

## 2024-10-01 DIAGNOSIS — Z23 NEED FOR IMMUNIZATION AGAINST INFLUENZA: ICD-10-CM

## 2024-10-01 DIAGNOSIS — E78.2 MIXED HYPERLIPIDEMIA: Primary | ICD-10-CM

## 2024-10-01 PROCEDURE — 99214 OFFICE O/P EST MOD 30 MIN: CPT | Mod: ,,, | Performed by: FAMILY MEDICINE

## 2024-10-01 PROCEDURE — G0008 ADMIN INFLUENZA VIRUS VAC: HCPCS | Mod: ,,, | Performed by: FAMILY MEDICINE

## 2024-10-01 PROCEDURE — 90653 IIV ADJUVANT VACCINE IM: CPT | Mod: ,,, | Performed by: FAMILY MEDICINE

## 2024-10-01 NOTE — PROGRESS NOTES
Livier Romero is a 79 y.o. female seen today for concerns about urinary incontinence.  Patient is unsure if she wants surgery at this time.  We discussed Kegel exercises and I recommended trying them for at least a month.  She has an appointment in a month and I have asked her to come fasting for lab work a few days before that.    Past Medical History:   Diagnosis Date    Adrenal adenoma     Anxiety     Asthma     Ataxic gait     Cerebral infarction     COPD (chronic obstructive pulmonary disease)     Depression     Hearing loss     Hyperlipidemia     Hypertension     Restless legs      Family History   Problem Relation Name Age of Onset    Heart disease Mother      Stroke Father      Cancer Sister      Lung cancer Maternal Grandmother       Current Outpatient Medications on File Prior to Visit   Medication Sig Dispense Refill    albuterol (PROVENTIL/VENTOLIN HFA) 90 mcg/actuation inhaler Inhale 2 puffs into the lungs every 6 (six) hours as needed for Wheezing. Rescue 18 g 5    amLODIPine (NORVASC) 5 MG tablet Take 1 tablet (5 mg total) by mouth once daily. 90 tablet 1    busPIRone (BUSPAR) 10 MG tablet Take 1 tablet (10 mg total) by mouth 3 (three) times daily. As needed for anxiety 90 tablet 5    clopidogreL (PLAVIX) 75 mg tablet Take 1 tablet (75 mg total) by mouth once daily. 90 tablet 1    EScitalopram oxalate (LEXAPRO) 20 MG tablet Take 1 tablet (20 mg total) by mouth once daily. 90 tablet 1    fluticasone propionate (FLONASE) 50 mcg/actuation nasal spray 2 sprays (100 mcg total) by Each Nostril route once daily. 16 g 5    gabapentin (NEURONTIN) 100 MG capsule Take 1 capsule (100 mg total) by mouth every evening. 90 capsule 1    isosorbide mononitrate (IMDUR) 30 MG 24 hr tablet Take 1 tablet (30 mg total) by mouth once daily. 90 tablet 1    lisinopriL (PRINIVIL,ZESTRIL) 40 MG tablet Take 1 tablet (40 mg total) by mouth once daily. 90 tablet 1    metoprolol tartrate (LOPRESSOR) 25 MG tablet Take 0.5 tablets  (12.5 mg total) by mouth 2 (two) times daily. 90 tablet 1    montelukast (SINGULAIR) 10 mg tablet Take 1 tablet (10 mg total) by mouth once daily. 90 tablet 1    pantoprazole (PROTONIX) 40 MG tablet Take 1 tablet (40 mg total) by mouth once daily. 90 tablet 1    rOPINIRole (REQUIP) 0.25 MG tablet Take 1 tablet (0.25 mg total) by mouth 3 (three) times daily. 180 tablet 1    rosuvastatin (CRESTOR) 40 MG Tab Take 1 tablet (40 mg total) by mouth every evening. 90 tablet 1    TRELEGY ELLIPTA 100-62.5-25 mcg DsDv Take 1 puff by mouth once daily. 60 each 5    aspirin 81 MG Chew Take 1 tablet (81 mg total) by mouth once daily. 90 tablet 1    tolterodine (DETROL LA) 2 MG Cp24 Take 1 capsule (2 mg total) by mouth once daily. (Patient not taking: Reported on 10/1/2024) 30 capsule 0     Current Facility-Administered Medications on File Prior to Visit   Medication Dose Route Frequency Provider Last Rate Last Admin    inclisiran 284 mg/1.5 mL subcutaneous injection 284 mg  284 mg Subcutaneous Q90 Days Pa Bunch MD         Immunization History   Administered Date(s) Administered    Influenza (FLUAD) - Quadrivalent - Adjuvanted - PF *Preferred* (65+) 12/27/2023    Influenza - Trivalent - Fluad - Adjuvanted - PF (65 years and older 10/01/2024    Pneumococcal Conjugate - 13 Valent 01/09/2018    Pneumococcal Polysaccharide - 23 Valent 10/03/2016       Review of Systems   Constitutional:  Negative for fever, malaise/fatigue and weight loss.   Respiratory:  Positive for shortness of breath.    Cardiovascular:  Negative for chest pain and palpitations.   Gastrointestinal:  Negative for nausea and vomiting.   Psychiatric/Behavioral:  Negative for depression.         Vitals:    10/01/24 1608   BP: 128/62   Pulse: 64   Resp: 19   Temp: 98.5 °F (36.9 °C)       Physical Exam  Vitals reviewed.   Eyes:      Conjunctiva/sclera: Conjunctivae normal.   Pulmonary:      Effort: Pulmonary effort is normal.   Neurological:      Mental  Status: She is alert.   Psychiatric:         Mood and Affect: Mood normal.         Behavior: Behavior normal.         Thought Content: Thought content normal.         Judgment: Judgment normal.          Assessment and Plan  1. Mixed hyperlipidemia  -     CBC Auto Differential; Future; Expected date: 11/01/2024  -     Comprehensive Metabolic Panel; Future; Expected date: 11/01/2024  -     Lipid Panel; Future; Expected date: 11/01/2024    2. Need for immunization against influenza  -     influenza (adjuvanted) (Fluad) 45 mcg/0.5 mL IM vaccine (> or = 64 yo) 0.5 mL             Return to clinic in 1 month or as needed.    Health Maintenance Topics with due status: Not Due       Topic Last Completion Date    Lipid Panel 02/12/2024    DEXA Scan 05/15/2024

## 2024-10-23 ENCOUNTER — TELEPHONE (OUTPATIENT)
Dept: FAMILY MEDICINE | Facility: CLINIC | Age: 79
End: 2024-10-23
Payer: MEDICARE

## 2024-10-23 NOTE — TELEPHONE ENCOUNTER
----- Message from Pa Bunch MD sent at 10/21/2024  7:51 AM CDT -----  Mildly worsening renal dysfunction and triglycerides.  We can discuss at her office visit in November

## 2024-10-25 ENCOUNTER — TELEPHONE (OUTPATIENT)
Dept: FAMILY MEDICINE | Facility: CLINIC | Age: 79
End: 2024-10-25
Payer: MEDICARE

## 2024-11-07 ENCOUNTER — TELEPHONE (OUTPATIENT)
Dept: FAMILY MEDICINE | Facility: CLINIC | Age: 79
End: 2024-11-07
Payer: MEDICARE

## 2024-11-07 NOTE — TELEPHONE ENCOUNTER
----- Message from Pauline sent at 11/7/2024  4:05 PM CST -----  Regarding: Pt needs: a call back about lab results.  Contact: Patient  Pt needs: a call back about lab results.        Phone #: [phone]

## 2024-11-07 NOTE — TELEPHONE ENCOUNTER
----- Message from Pauline sent at 11/7/2024  4:05 PM CST -----  Regarding: Pt needs: a call back about lab results.  Contact: Patient  Pt needs: a call back about lab results.        Phone #: [phone]

## 2024-11-07 NOTE — TELEPHONE ENCOUNTER
Callback placed to patient , patient was notified that her labs showed she did have some Mildly worsening renal dysfunction and triglycerides. Dr Bunch stated that he could  discuss at her office visit in November, patient verbalized understanding.

## 2024-11-13 ENCOUNTER — OFFICE VISIT (OUTPATIENT)
Dept: FAMILY MEDICINE | Facility: CLINIC | Age: 79
End: 2024-11-13
Payer: MEDICARE

## 2024-11-13 VITALS
SYSTOLIC BLOOD PRESSURE: 124 MMHG | RESPIRATION RATE: 19 BRPM | HEIGHT: 67 IN | WEIGHT: 223 LBS | DIASTOLIC BLOOD PRESSURE: 64 MMHG | TEMPERATURE: 99 F | OXYGEN SATURATION: 96 % | HEART RATE: 61 BPM | BODY MASS INDEX: 35 KG/M2

## 2024-11-13 DIAGNOSIS — R32 INCONTINENCE IN FEMALE: Primary | ICD-10-CM

## 2024-11-13 DIAGNOSIS — I63.89 CEREBROVASCULAR ACCIDENT (CVA) DUE TO OTHER MECHANISM: ICD-10-CM

## 2024-11-13 DIAGNOSIS — F33.1 MODERATE EPISODE OF RECURRENT MAJOR DEPRESSIVE DISORDER: ICD-10-CM

## 2024-11-13 DIAGNOSIS — Z91.09 ENVIRONMENTAL ALLERGIES: ICD-10-CM

## 2024-11-13 DIAGNOSIS — G25.81 RESTLESS LEG: ICD-10-CM

## 2024-11-13 DIAGNOSIS — E78.2 MIXED HYPERLIPIDEMIA: ICD-10-CM

## 2024-11-13 DIAGNOSIS — F41.9 ANXIETY: ICD-10-CM

## 2024-11-13 DIAGNOSIS — F17.200 SMOKING: ICD-10-CM

## 2024-11-13 DIAGNOSIS — K21.9 GASTROESOPHAGEAL REFLUX DISEASE WITHOUT ESOPHAGITIS: ICD-10-CM

## 2024-11-13 DIAGNOSIS — I10 PRIMARY HYPERTENSION: ICD-10-CM

## 2024-11-13 DIAGNOSIS — J30.9 ALLERGIC RHINITIS, UNSPECIFIED SEASONALITY, UNSPECIFIED TRIGGER: ICD-10-CM

## 2024-11-13 RX ORDER — AMLODIPINE BESYLATE 5 MG/1
5 TABLET ORAL DAILY
Qty: 90 TABLET | Refills: 1 | Status: SHIPPED | OUTPATIENT
Start: 2024-11-13

## 2024-11-13 RX ORDER — BUSPIRONE HYDROCHLORIDE 10 MG/1
10 TABLET ORAL 3 TIMES DAILY
Qty: 90 TABLET | Refills: 5 | Status: SHIPPED | OUTPATIENT
Start: 2024-11-13

## 2024-11-13 RX ORDER — METOPROLOL TARTRATE 25 MG/1
12.5 TABLET, FILM COATED ORAL 2 TIMES DAILY
Qty: 90 TABLET | Refills: 1 | Status: SHIPPED | OUTPATIENT
Start: 2024-11-13

## 2024-11-13 RX ORDER — PANTOPRAZOLE SODIUM 40 MG/1
40 TABLET, DELAYED RELEASE ORAL DAILY
Qty: 90 TABLET | Refills: 1 | Status: SHIPPED | OUTPATIENT
Start: 2024-11-13

## 2024-11-13 RX ORDER — GABAPENTIN 100 MG/1
100 CAPSULE ORAL NIGHTLY
Qty: 90 CAPSULE | Refills: 1 | Status: SHIPPED | OUTPATIENT
Start: 2024-11-13

## 2024-11-13 RX ORDER — MONTELUKAST SODIUM 10 MG/1
10 TABLET ORAL DAILY
Qty: 90 TABLET | Refills: 1 | Status: SHIPPED | OUTPATIENT
Start: 2024-11-13

## 2024-11-13 RX ORDER — ESCITALOPRAM OXALATE 20 MG/1
20 TABLET ORAL DAILY
Qty: 90 TABLET | Refills: 1 | Status: SHIPPED | OUTPATIENT
Start: 2024-11-13

## 2024-11-13 RX ORDER — ROPINIROLE 0.25 MG/1
0.25 TABLET, FILM COATED ORAL 3 TIMES DAILY
Qty: 270 TABLET | Refills: 1 | Status: SHIPPED | OUTPATIENT
Start: 2024-11-13

## 2024-11-13 RX ORDER — ISOSORBIDE MONONITRATE 30 MG/1
30 TABLET, EXTENDED RELEASE ORAL DAILY
Qty: 90 TABLET | Refills: 1 | Status: SHIPPED | OUTPATIENT
Start: 2024-11-13

## 2024-11-13 RX ORDER — ROSUVASTATIN CALCIUM 40 MG/1
40 TABLET, COATED ORAL NIGHTLY
Qty: 90 TABLET | Refills: 1 | Status: SHIPPED | OUTPATIENT
Start: 2024-11-13

## 2024-11-13 RX ORDER — CLOPIDOGREL BISULFATE 75 MG/1
75 TABLET ORAL DAILY
Qty: 90 TABLET | Refills: 1 | Status: SHIPPED | OUTPATIENT
Start: 2024-11-13

## 2024-11-13 RX ORDER — LORATADINE 10 MG/1
10 TABLET ORAL DAILY
Qty: 90 TABLET | Refills: 3 | Status: SHIPPED | OUTPATIENT
Start: 2024-11-13 | End: 2025-11-13

## 2024-11-13 RX ORDER — LISINOPRIL 40 MG/1
40 TABLET ORAL DAILY
Qty: 90 TABLET | Refills: 1 | Status: SHIPPED | OUTPATIENT
Start: 2024-11-13

## 2024-11-13 NOTE — PROGRESS NOTES
Livier Romero is a 79 y.o. female seen today for follow-up.  The patient was seen by our local gynecologist and offered and out of the area evaluation for her urinary incontinence.  Prefer to have a 2nd opinion through Urology.  The patient's renal function has declined and already she has been set up for follow-up with Nephrology.  She is also complaining of increased nasal congestion postnasal drainage and we discussed restarting her Flonase and Claritin.      Past Medical History:   Diagnosis Date    Adrenal adenoma     Anxiety     Asthma     Ataxic gait     Cerebral infarction     COPD (chronic obstructive pulmonary disease)     Depression     Hearing loss     Hyperlipidemia     Hypertension     Restless legs      Family History   Problem Relation Name Age of Onset    Heart disease Mother      Stroke Father      Cancer Sister      Lung cancer Maternal Grandmother       Current Outpatient Medications on File Prior to Visit   Medication Sig Dispense Refill    albuterol (PROVENTIL/VENTOLIN HFA) 90 mcg/actuation inhaler Inhale 2 puffs into the lungs every 6 (six) hours as needed for Wheezing. Rescue 18 g 5    fluticasone propionate (FLONASE) 50 mcg/actuation nasal spray 2 sprays (100 mcg total) by Each Nostril route once daily. 16 g 5    TRELEGY ELLIPTA 100-62.5-25 mcg DsDv Take 1 puff by mouth once daily. 60 each 5    [DISCONTINUED] amLODIPine (NORVASC) 5 MG tablet Take 1 tablet (5 mg total) by mouth once daily. 90 tablet 1    [DISCONTINUED] busPIRone (BUSPAR) 10 MG tablet Take 1 tablet (10 mg total) by mouth 3 (three) times daily. As needed for anxiety 90 tablet 5    [DISCONTINUED] clopidogreL (PLAVIX) 75 mg tablet Take 1 tablet (75 mg total) by mouth once daily. 90 tablet 1    [DISCONTINUED] EScitalopram oxalate (LEXAPRO) 20 MG tablet Take 1 tablet (20 mg total) by mouth once daily. 90 tablet 1    [DISCONTINUED] gabapentin (NEURONTIN) 100 MG capsule Take 1 capsule (100 mg total) by mouth every evening. 90  capsule 1    [DISCONTINUED] isosorbide mononitrate (IMDUR) 30 MG 24 hr tablet Take 1 tablet (30 mg total) by mouth once daily. 90 tablet 1    [DISCONTINUED] lisinopriL (PRINIVIL,ZESTRIL) 40 MG tablet Take 1 tablet (40 mg total) by mouth once daily. 90 tablet 1    [DISCONTINUED] metoprolol tartrate (LOPRESSOR) 25 MG tablet Take 0.5 tablets (12.5 mg total) by mouth 2 (two) times daily. 90 tablet 1    [DISCONTINUED] montelukast (SINGULAIR) 10 mg tablet Take 1 tablet (10 mg total) by mouth once daily. 90 tablet 1    [DISCONTINUED] pantoprazole (PROTONIX) 40 MG tablet Take 1 tablet (40 mg total) by mouth once daily. 90 tablet 1    [DISCONTINUED] rOPINIRole (REQUIP) 0.25 MG tablet Take 1 tablet (0.25 mg total) by mouth 3 (three) times daily. 180 tablet 1    [DISCONTINUED] rosuvastatin (CRESTOR) 40 MG Tab Take 1 tablet (40 mg total) by mouth every evening. 90 tablet 1    aspirin 81 MG Chew Take 1 tablet (81 mg total) by mouth once daily. 90 tablet 1    tolterodine (DETROL LA) 2 MG Cp24 Take 1 capsule (2 mg total) by mouth once daily. (Patient not taking: Reported on 5/8/2024) 30 capsule 0     Current Facility-Administered Medications on File Prior to Visit   Medication Dose Route Frequency Provider Last Rate Last Admin    inclisiran 284 mg/1.5 mL subcutaneous injection 284 mg  284 mg Subcutaneous Q90 Days Pa Bunch MD         Immunization History   Administered Date(s) Administered    Influenza (FLUAD) - Quadrivalent - Adjuvanted - PF *Preferred* (65+) 12/27/2023    Influenza - Trivalent - Fluad - Adjuvanted - PF (65 years and older 10/01/2024    Pneumococcal Conjugate - 13 Valent 01/09/2018    Pneumococcal Polysaccharide - 23 Valent 10/03/2016       Review of Systems   Constitutional:  Negative for fever, malaise/fatigue and weight loss.   HENT:  Positive for congestion.    Respiratory:  Positive for shortness of breath.    Cardiovascular:  Negative for chest pain and palpitations.   Gastrointestinal:  Negative  for nausea and vomiting.   Psychiatric/Behavioral:  Negative for depression.         Vitals:    11/13/24 1507   BP: 124/64   Pulse: 61   Resp: 19   Temp: 98.5 °F (36.9 °C)       Physical Exam  Vitals reviewed.   Eyes:      Conjunctiva/sclera: Conjunctivae normal.   Pulmonary:      Effort: Pulmonary effort is normal.   Neurological:      Mental Status: She is alert.   Psychiatric:         Mood and Affect: Mood normal.         Behavior: Behavior normal.         Thought Content: Thought content normal.         Judgment: Judgment normal.          Assessment and Plan  1. Incontinence in female  -     Ambulatory referral/consult to Urology; Future; Expected date: 11/20/2024    2. Restless leg  -     rOPINIRole (REQUIP) 0.25 MG tablet; Take 1 tablet (0.25 mg total) by mouth 3 (three) times daily.  Dispense: 270 tablet; Refill: 1  -     gabapentin (NEURONTIN) 100 MG capsule; Take 1 capsule (100 mg total) by mouth every evening.  Dispense: 90 capsule; Refill: 1    3. Anxiety  -     busPIRone (BUSPAR) 10 MG tablet; Take 1 tablet (10 mg total) by mouth 3 (three) times daily. As needed for anxiety  Dispense: 90 tablet; Refill: 5    4. Environmental allergies  -     montelukast (SINGULAIR) 10 mg tablet; Take 1 tablet (10 mg total) by mouth once daily.  Dispense: 90 tablet; Refill: 1    5. Primary hypertension  -     metoprolol tartrate (LOPRESSOR) 25 MG tablet; Take 0.5 tablets (12.5 mg total) by mouth 2 (two) times daily.  Dispense: 90 tablet; Refill: 1  -     isosorbide mononitrate (IMDUR) 30 MG 24 hr tablet; Take 1 tablet (30 mg total) by mouth once daily.  Dispense: 90 tablet; Refill: 1  -     lisinopriL (PRINIVIL,ZESTRIL) 40 MG tablet; Take 1 tablet (40 mg total) by mouth once daily.  Dispense: 90 tablet; Refill: 1  -     amLODIPine (NORVASC) 5 MG tablet; Take 1 tablet (5 mg total) by mouth once daily.  Dispense: 90 tablet; Refill: 1    6. Moderate episode of recurrent major depressive disorder  -     EScitalopram oxalate  (LEXAPRO) 20 MG tablet; Take 1 tablet (20 mg total) by mouth once daily.  Dispense: 90 tablet; Refill: 1    7. Gastroesophageal reflux disease without esophagitis  -     pantoprazole (PROTONIX) 40 MG tablet; Take 1 tablet (40 mg total) by mouth once daily.  Dispense: 90 tablet; Refill: 1    8. Mixed hyperlipidemia  -     rosuvastatin (CRESTOR) 40 MG Tab; Take 1 tablet (40 mg total) by mouth every evening.  Dispense: 90 tablet; Refill: 1    9. Cerebrovascular accident (CVA) due to other mechanism  -     clopidogreL (PLAVIX) 75 mg tablet; Take 1 tablet (75 mg total) by mouth once daily.  Dispense: 90 tablet; Refill: 1    10. Smoking  -     CT Chest Lung Screening Low Dose; Future; Expected date: 11/13/2024    11. Allergic rhinitis, unspecified seasonality, unspecified trigger  -     loratadine (CLARITIN) 10 mg tablet; Take 1 tablet (10 mg total) by mouth once daily.  Dispense: 90 tablet; Refill: 3             Return to clinic in three months or as needed.  She is up-to-date on her flu vaccine.    Health Maintenance Topics with due status: Not Due       Topic Last Completion Date    DEXA Scan 05/15/2024    Lipid Panel 10/18/2024

## 2025-03-07 ENCOUNTER — OFFICE VISIT (OUTPATIENT)
Dept: FAMILY MEDICINE | Facility: CLINIC | Age: 80
End: 2025-03-07
Payer: MEDICARE

## 2025-03-07 VITALS
DIASTOLIC BLOOD PRESSURE: 91 MMHG | OXYGEN SATURATION: 95 % | SYSTOLIC BLOOD PRESSURE: 165 MMHG | BODY MASS INDEX: 35.41 KG/M2 | RESPIRATION RATE: 17 BRPM | HEIGHT: 67 IN | HEART RATE: 82 BPM | TEMPERATURE: 99 F | WEIGHT: 225.63 LBS

## 2025-03-07 DIAGNOSIS — J30.9 ALLERGIC RHINITIS, UNSPECIFIED SEASONALITY, UNSPECIFIED TRIGGER: ICD-10-CM

## 2025-03-07 DIAGNOSIS — E78.2 MIXED HYPERLIPIDEMIA: ICD-10-CM

## 2025-03-07 DIAGNOSIS — J01.40 ACUTE NON-RECURRENT PANSINUSITIS: ICD-10-CM

## 2025-03-07 DIAGNOSIS — I10 PRIMARY HYPERTENSION: ICD-10-CM

## 2025-03-07 DIAGNOSIS — Z91.09 ENVIRONMENTAL ALLERGIES: ICD-10-CM

## 2025-03-07 DIAGNOSIS — F33.1 MODERATE EPISODE OF RECURRENT MAJOR DEPRESSIVE DISORDER: ICD-10-CM

## 2025-03-07 DIAGNOSIS — K21.9 GASTROESOPHAGEAL REFLUX DISEASE WITHOUT ESOPHAGITIS: ICD-10-CM

## 2025-03-07 DIAGNOSIS — I63.89 CEREBROVASCULAR ACCIDENT (CVA) DUE TO OTHER MECHANISM: ICD-10-CM

## 2025-03-07 DIAGNOSIS — F41.9 ANXIETY: ICD-10-CM

## 2025-03-07 DIAGNOSIS — N18.32 STAGE 3B CHRONIC KIDNEY DISEASE: ICD-10-CM

## 2025-03-07 DIAGNOSIS — G25.81 RESTLESS LEG: ICD-10-CM

## 2025-03-07 DIAGNOSIS — Z23 NEED FOR VACCINATION AGAINST STREPTOCOCCUS PNEUMONIAE: Primary | ICD-10-CM

## 2025-03-07 DIAGNOSIS — J44.9 CHRONIC OBSTRUCTIVE PULMONARY DISEASE, UNSPECIFIED COPD TYPE: ICD-10-CM

## 2025-03-07 LAB
ALBUMIN SERPL BCP-MCNC: 3.6 G/DL (ref 3.4–4.8)
ALBUMIN/GLOB SERPL: 1.1 {RATIO}
ALP SERPL-CCNC: 97 U/L (ref 40–150)
ALT SERPL W P-5'-P-CCNC: <7 U/L
ANION GAP SERPL CALCULATED.3IONS-SCNC: 12 MMOL/L (ref 7–16)
AST SERPL W P-5'-P-CCNC: 13 U/L (ref 5–34)
BASOPHILS # BLD AUTO: 0.09 K/UL (ref 0–0.2)
BASOPHILS NFR BLD AUTO: 1.3 % (ref 0–1)
BILIRUB SERPL-MCNC: 0.3 MG/DL
BUN SERPL-MCNC: 19 MG/DL (ref 10–20)
BUN/CREAT SERPL: 16 (ref 6–20)
CALCIUM SERPL-MCNC: 10.1 MG/DL (ref 8.4–10.2)
CHLORIDE SERPL-SCNC: 104 MMOL/L (ref 98–107)
CO2 SERPL-SCNC: 31 MMOL/L (ref 23–31)
CREAT SERPL-MCNC: 1.19 MG/DL (ref 0.55–1.02)
DIFFERENTIAL METHOD BLD: ABNORMAL
EGFR (NO RACE VARIABLE) (RUSH/TITUS): 47 ML/MIN/1.73M2
EOSINOPHIL # BLD AUTO: 0.19 K/UL (ref 0–0.5)
EOSINOPHIL NFR BLD AUTO: 2.7 % (ref 1–4)
ERYTHROCYTE [DISTWIDTH] IN BLOOD BY AUTOMATED COUNT: 13.2 % (ref 11.5–14.5)
GLOBULIN SER-MCNC: 3.3 G/DL (ref 2–4)
GLUCOSE SERPL-MCNC: 112 MG/DL (ref 82–115)
HCT VFR BLD AUTO: 45 % (ref 38–47)
HGB BLD-MCNC: 13.5 G/DL (ref 12–16)
IMM GRANULOCYTES # BLD AUTO: 0.02 K/UL (ref 0–0.04)
IMM GRANULOCYTES NFR BLD: 0.3 % (ref 0–0.4)
LYMPHOCYTES # BLD AUTO: 2.37 K/UL (ref 1–4.8)
LYMPHOCYTES NFR BLD AUTO: 33.2 % (ref 27–41)
MCH RBC QN AUTO: 29.2 PG (ref 27–31)
MCHC RBC AUTO-ENTMCNC: 30 G/DL (ref 32–36)
MCV RBC AUTO: 97.4 FL (ref 80–96)
MONOCYTES # BLD AUTO: 0.58 K/UL (ref 0–0.8)
MONOCYTES NFR BLD AUTO: 8.1 % (ref 2–6)
MPC BLD CALC-MCNC: 11 FL (ref 9.4–12.4)
NEUTROPHILS # BLD AUTO: 3.89 K/UL (ref 1.8–7.7)
NEUTROPHILS NFR BLD AUTO: 54.4 % (ref 53–65)
NRBC # BLD AUTO: 0 X10E3/UL
NRBC, AUTO (.00): 0 %
PLATELET # BLD AUTO: 212 K/UL (ref 150–400)
POTASSIUM SERPL-SCNC: 4.3 MMOL/L (ref 3.5–5.1)
PROT SERPL-MCNC: 6.9 G/DL (ref 5.8–7.6)
RBC # BLD AUTO: 4.62 M/UL (ref 4.2–5.4)
SODIUM SERPL-SCNC: 143 MMOL/L (ref 136–145)
WBC # BLD AUTO: 7.14 K/UL (ref 4.5–11)

## 2025-03-07 PROCEDURE — 80053 COMPREHEN METABOLIC PANEL: CPT | Mod: ,,, | Performed by: CLINICAL MEDICAL LABORATORY

## 2025-03-07 PROCEDURE — 85025 COMPLETE CBC W/AUTO DIFF WBC: CPT | Mod: ,,, | Performed by: CLINICAL MEDICAL LABORATORY

## 2025-03-07 RX ORDER — NAPROXEN SODIUM 220 MG/1
81 TABLET, FILM COATED ORAL DAILY
Qty: 90 TABLET | Refills: 1 | Status: SHIPPED | OUTPATIENT
Start: 2025-03-07 | End: 2026-03-07

## 2025-03-07 RX ORDER — ROPINIROLE 0.25 MG/1
0.25 TABLET, FILM COATED ORAL 3 TIMES DAILY
Qty: 270 TABLET | Refills: 1 | Status: SHIPPED | OUTPATIENT
Start: 2025-03-07

## 2025-03-07 RX ORDER — ALBUTEROL SULFATE 90 UG/1
2 INHALANT RESPIRATORY (INHALATION) EVERY 6 HOURS PRN
Qty: 18 G | Refills: 5 | Status: SHIPPED | OUTPATIENT
Start: 2025-03-07

## 2025-03-07 RX ORDER — ISOSORBIDE MONONITRATE 30 MG/1
30 TABLET, EXTENDED RELEASE ORAL DAILY
Qty: 90 TABLET | Refills: 1 | Status: SHIPPED | OUTPATIENT
Start: 2025-03-07

## 2025-03-07 RX ORDER — MONTELUKAST SODIUM 10 MG/1
10 TABLET ORAL DAILY
Qty: 90 TABLET | Refills: 1 | Status: SHIPPED | OUTPATIENT
Start: 2025-03-07

## 2025-03-07 RX ORDER — LORATADINE 10 MG/1
10 TABLET ORAL DAILY
Qty: 90 TABLET | Refills: 1 | Status: SHIPPED | OUTPATIENT
Start: 2025-03-07 | End: 2026-03-07

## 2025-03-07 RX ORDER — FLUTICASONE PROPIONATE 50 MCG
2 SPRAY, SUSPENSION (ML) NASAL DAILY
Qty: 16 G | Refills: 5 | Status: SHIPPED | OUTPATIENT
Start: 2025-03-07

## 2025-03-07 RX ORDER — FLUTICASONE FUROATE, UMECLIDINIUM BROMIDE AND VILANTEROL TRIFENATATE 100; 62.5; 25 UG/1; UG/1; UG/1
1 POWDER RESPIRATORY (INHALATION) DAILY
Qty: 60 EACH | Refills: 5 | Status: SHIPPED | OUTPATIENT
Start: 2025-03-07

## 2025-03-07 RX ORDER — GABAPENTIN 100 MG/1
100 CAPSULE ORAL NIGHTLY
Qty: 90 CAPSULE | Refills: 1 | Status: SHIPPED | OUTPATIENT
Start: 2025-03-07

## 2025-03-07 RX ORDER — PANTOPRAZOLE SODIUM 40 MG/1
40 TABLET, DELAYED RELEASE ORAL DAILY
Qty: 90 TABLET | Refills: 1 | Status: SHIPPED | OUTPATIENT
Start: 2025-03-07

## 2025-03-07 RX ORDER — METOPROLOL TARTRATE 25 MG/1
12.5 TABLET, FILM COATED ORAL 2 TIMES DAILY
Qty: 90 TABLET | Refills: 1 | Status: SHIPPED | OUTPATIENT
Start: 2025-03-07

## 2025-03-07 RX ORDER — BUSPIRONE HYDROCHLORIDE 10 MG/1
10 TABLET ORAL 3 TIMES DAILY
Qty: 90 TABLET | Refills: 5 | Status: SHIPPED | OUTPATIENT
Start: 2025-03-07

## 2025-03-07 RX ORDER — ROSUVASTATIN CALCIUM 40 MG/1
40 TABLET, COATED ORAL NIGHTLY
Qty: 90 TABLET | Refills: 1 | Status: SHIPPED | OUTPATIENT
Start: 2025-03-07

## 2025-03-07 RX ORDER — CLOPIDOGREL BISULFATE 75 MG/1
75 TABLET ORAL DAILY
Qty: 90 TABLET | Refills: 1 | Status: SHIPPED | OUTPATIENT
Start: 2025-03-07

## 2025-03-07 RX ORDER — LISINOPRIL 40 MG/1
40 TABLET ORAL DAILY
Qty: 90 TABLET | Refills: 1 | Status: SHIPPED | OUTPATIENT
Start: 2025-03-07

## 2025-03-07 RX ORDER — AMLODIPINE BESYLATE 5 MG/1
5 TABLET ORAL DAILY
Qty: 90 TABLET | Refills: 1 | Status: SHIPPED | OUTPATIENT
Start: 2025-03-07

## 2025-03-07 RX ORDER — ESCITALOPRAM OXALATE 20 MG/1
20 TABLET ORAL DAILY
Qty: 90 TABLET | Refills: 1 | Status: SHIPPED | OUTPATIENT
Start: 2025-03-07

## 2025-03-07 NOTE — PROGRESS NOTES
Livier Romero is a 79 y.o. female seen today for following up on her renal function and COPD.  She is up-to-date on her flu vaccine.  She will need her Prevnar 20 today.  She also needs to continue her portable O2 and home nebulizer therapy to manage her COPD and hypoxia.     Past Medical History:   Diagnosis Date    Adrenal adenoma     Anxiety     Asthma     Ataxic gait     Cerebral infarction     COPD (chronic obstructive pulmonary disease)     Depression     Hearing loss     Hyperlipidemia     Hypertension     Restless legs      Family History   Problem Relation Name Age of Onset    Heart disease Mother      Stroke Father      Cancer Sister      Lung cancer Maternal Grandmother       Medications Ordered Prior to Encounter[1]  Immunization History   Administered Date(s) Administered    Influenza (FLUAD) - Quadrivalent - Adjuvanted - PF *Preferred* (65+) 12/27/2023    Influenza - Trivalent - Fluad - Adjuvanted - PF (65 years and older 10/01/2024    Pneumococcal Conjugate - 13 Valent 01/09/2018    Pneumococcal Polysaccharide - 23 Valent 10/03/2016       Review of Systems   Constitutional:  Positive for malaise/fatigue. Negative for fever and weight loss.   Respiratory:  Positive for shortness of breath.    Cardiovascular:  Negative for chest pain and palpitations.   Gastrointestinal:  Negative for nausea and vomiting.   Psychiatric/Behavioral:  Negative for depression.         Vitals:    03/07/25 1034   BP: (!) 165/91   Pulse:    Resp:    Temp:        Physical Exam  Vitals reviewed.   Constitutional:       Appearance: Normal appearance.   HENT:      Head: Normocephalic.   Eyes:      Extraocular Movements: Extraocular movements intact.      Conjunctiva/sclera: Conjunctivae normal.      Pupils: Pupils are equal, round, and reactive to light.   Neck:      Thyroid: No thyroid mass or thyromegaly.   Cardiovascular:      Rate and Rhythm: Normal rate and regular rhythm.      Heart sounds: Normal heart sounds. No murmur  heard.     No gallop.   Pulmonary:      Effort: Pulmonary effort is normal. No respiratory distress.      Breath sounds: Decreased air movement present. Decreased breath sounds present. No wheezing or rales.   Skin:     General: Skin is warm and dry.      Coloration: Skin is not jaundiced or pale.   Neurological:      Mental Status: She is alert.   Psychiatric:         Mood and Affect: Mood normal.         Behavior: Behavior normal.         Thought Content: Thought content normal.         Judgment: Judgment normal.          Assessment and Plan  1. Need for vaccination against Streptococcus pneumoniae  -     pneumoc 20-rachid conj-dip cr(PF) (PREVNAR-20 (PF)) injection Syrg 0.5 mL    2. Primary hypertension  -     amLODIPine (NORVASC) 5 MG tablet; Take 1 tablet (5 mg total) by mouth once daily.  Dispense: 90 tablet; Refill: 1  -     isosorbide mononitrate (IMDUR) 30 MG 24 hr tablet; Take 1 tablet (30 mg total) by mouth once daily.  Dispense: 90 tablet; Refill: 1  -     lisinopriL (PRINIVIL,ZESTRIL) 40 MG tablet; Take 1 tablet (40 mg total) by mouth once daily.  Dispense: 90 tablet; Refill: 1  -     metoprolol tartrate (LOPRESSOR) 25 MG tablet; Take 0.5 tablets (12.5 mg total) by mouth 2 (two) times daily.  Dispense: 90 tablet; Refill: 1  -     Comprehensive Metabolic Panel; Future; Expected date: 03/07/2025  -     CBC Auto Differential; Future; Expected date: 03/07/2025    3. Anxiety  -     busPIRone (BUSPAR) 10 MG tablet; Take 1 tablet (10 mg total) by mouth 3 (three) times daily. As needed for anxiety  Dispense: 90 tablet; Refill: 5    4. Cerebrovascular accident (CVA) due to other mechanism  -     aspirin 81 MG Chew; Take 1 tablet (81 mg total) by mouth once daily.  Dispense: 90 tablet; Refill: 1  -     clopidogreL (PLAVIX) 75 mg tablet; Take 1 tablet (75 mg total) by mouth once daily.  Dispense: 90 tablet; Refill: 1    5. Moderate episode of recurrent major depressive disorder  -     EScitalopram oxalate (LEXAPRO)  20 MG tablet; Take 1 tablet (20 mg total) by mouth once daily.  Dispense: 90 tablet; Refill: 1    6. Acute non-recurrent pansinusitis  -     fluticasone propionate (FLONASE) 50 mcg/actuation nasal spray; 2 sprays (100 mcg total) by Each Nostril route once daily.  Dispense: 16 g; Refill: 5    7. Restless leg  -     gabapentin (NEURONTIN) 100 MG capsule; Take 1 capsule (100 mg total) by mouth every evening.  Dispense: 90 capsule; Refill: 1  -     rOPINIRole (REQUIP) 0.25 MG tablet; Take 1 tablet (0.25 mg total) by mouth 3 (three) times daily.  Dispense: 270 tablet; Refill: 1    8. Environmental allergies  -     montelukast (SINGULAIR) 10 mg tablet; Take 1 tablet (10 mg total) by mouth once daily.  Dispense: 90 tablet; Refill: 1    9. Gastroesophageal reflux disease without esophagitis  -     pantoprazole (PROTONIX) 40 MG tablet; Take 1 tablet (40 mg total) by mouth once daily.  Dispense: 90 tablet; Refill: 1    10. Chronic obstructive pulmonary disease, unspecified COPD type  -     albuterol (PROVENTIL/VENTOLIN HFA) 90 mcg/actuation inhaler; Inhale 2 puffs into the lungs every 6 (six) hours as needed for Wheezing. Rescue  Dispense: 18 g; Refill: 5  -     TRELEGY ELLIPTA 100-62.5-25 mcg DsDv; Take 1 puff by mouth once daily.  Dispense: 60 each; Refill: 5    11. Mixed hyperlipidemia  -     rosuvastatin (CRESTOR) 40 MG Tab; Take 1 tablet (40 mg total) by mouth every evening.  Dispense: 90 tablet; Refill: 1    12. Allergic rhinitis, unspecified seasonality, unspecified trigger  -     loratadine (CLARITIN) 10 mg tablet; Take 1 tablet (10 mg total) by mouth once daily.  Dispense: 90 tablet; Refill: 1    13. Stage 3b chronic kidney disease  Assessment & Plan:  We will continue current plan and monitor renal function today.               Return to clinic in 3 months or as needed.  The patient has not taken her blood pressure medication this morning and will stop by for blood pressure check on medicine.    Health Maintenance  Topics with due status: Not Due       Topic Last Completion Date    DEXA Scan 05/15/2024    Lipid Panel 10/18/2024              [1]   Current Outpatient Medications on File Prior to Visit   Medication Sig Dispense Refill    [DISCONTINUED] albuterol (PROVENTIL/VENTOLIN HFA) 90 mcg/actuation inhaler Inhale 2 puffs into the lungs every 6 (six) hours as needed for Wheezing. Rescue 18 g 5    [DISCONTINUED] amLODIPine (NORVASC) 5 MG tablet Take 1 tablet (5 mg total) by mouth once daily. 90 tablet 1    [DISCONTINUED] aspirin 81 MG Chew Take 1 tablet (81 mg total) by mouth once daily. 90 tablet 1    [DISCONTINUED] busPIRone (BUSPAR) 10 MG tablet Take 1 tablet (10 mg total) by mouth 3 (three) times daily. As needed for anxiety 90 tablet 5    [DISCONTINUED] clopidogreL (PLAVIX) 75 mg tablet Take 1 tablet (75 mg total) by mouth once daily. 90 tablet 1    [DISCONTINUED] EScitalopram oxalate (LEXAPRO) 20 MG tablet Take 1 tablet (20 mg total) by mouth once daily. 90 tablet 1    [DISCONTINUED] fluticasone propionate (FLONASE) 50 mcg/actuation nasal spray 2 sprays (100 mcg total) by Each Nostril route once daily. 16 g 5    [DISCONTINUED] gabapentin (NEURONTIN) 100 MG capsule Take 1 capsule (100 mg total) by mouth every evening. 90 capsule 1    [DISCONTINUED] isosorbide mononitrate (IMDUR) 30 MG 24 hr tablet Take 1 tablet (30 mg total) by mouth once daily. 90 tablet 1    [DISCONTINUED] lisinopriL (PRINIVIL,ZESTRIL) 40 MG tablet Take 1 tablet (40 mg total) by mouth once daily. 90 tablet 1    [DISCONTINUED] metoprolol tartrate (LOPRESSOR) 25 MG tablet Take 0.5 tablets (12.5 mg total) by mouth 2 (two) times daily. 90 tablet 1    [DISCONTINUED] montelukast (SINGULAIR) 10 mg tablet Take 1 tablet (10 mg total) by mouth once daily. 90 tablet 1    [DISCONTINUED] pantoprazole (PROTONIX) 40 MG tablet Take 1 tablet (40 mg total) by mouth once daily. 90 tablet 1    [DISCONTINUED] rOPINIRole (REQUIP) 0.25 MG tablet Take 1 tablet (0.25 mg total)  by mouth 3 (three) times daily. 270 tablet 1    [DISCONTINUED] rosuvastatin (CRESTOR) 40 MG Tab Take 1 tablet (40 mg total) by mouth every evening. 90 tablet 1    [DISCONTINUED] TRELEGY ELLIPTA 100-62.5-25 mcg DsDv Take 1 puff by mouth once daily. 60 each 5    [DISCONTINUED] loratadine (CLARITIN) 10 mg tablet Take 1 tablet (10 mg total) by mouth once daily. (Patient not taking: Reported on 3/7/2025) 90 tablet 3     Current Facility-Administered Medications on File Prior to Visit   Medication Dose Route Frequency Provider Last Rate Last Admin    inclisiran 284 mg/1.5 mL subcutaneous injection 284 mg  284 mg Subcutaneous Q90 Days Pa Bunch MD

## 2025-03-10 ENCOUNTER — RESULTS FOLLOW-UP (OUTPATIENT)
Dept: FAMILY MEDICINE | Facility: CLINIC | Age: 80
End: 2025-03-10
Payer: MEDICARE

## 2025-03-12 ENCOUNTER — TELEPHONE (OUTPATIENT)
Dept: FAMILY MEDICINE | Facility: CLINIC | Age: 80
End: 2025-03-12
Payer: MEDICARE

## 2025-03-12 NOTE — TELEPHONE ENCOUNTER
----- Message from Pa Bunch MD sent at 3/10/2025  7:53 AM CDT -----  Improved renal function but was still slightly impaired  ----- Message -----  From: Lab, Background User  Sent: 3/7/2025   7:36 PM CDT  To: Pa Bunch MD

## 2025-03-17 ENCOUNTER — TELEPHONE (OUTPATIENT)
Dept: FAMILY MEDICINE | Facility: CLINIC | Age: 80
End: 2025-03-17
Payer: MEDICARE

## 2025-03-19 ENCOUNTER — TELEPHONE (OUTPATIENT)
Dept: FAMILY MEDICINE | Facility: CLINIC | Age: 80
End: 2025-03-19
Payer: MEDICARE

## 2025-03-19 NOTE — TELEPHONE ENCOUNTER
----- Message from Jossy sent at 3/18/2025  4:40 PM CDT -----  Regarding: Lab Results  Pt's daughter,Kalina,returned call regarding pt's labs results [phone]

## 2025-03-20 ENCOUNTER — TELEPHONE (OUTPATIENT)
Dept: FAMILY MEDICINE | Facility: CLINIC | Age: 80
End: 2025-03-20
Payer: MEDICARE

## 2025-03-20 NOTE — TELEPHONE ENCOUNTER
----- Message from Med Assistant Carlos sent at 3/20/2025  2:15 PM CDT -----  Please give pt a call back at .  She says she missed a call from our office.

## 2025-03-21 ENCOUNTER — TELEPHONE (OUTPATIENT)
Dept: FAMILY MEDICINE | Facility: CLINIC | Age: 80
End: 2025-03-21
Payer: MEDICARE

## 2025-03-21 NOTE — TELEPHONE ENCOUNTER
Patient's daughter, Kalina, called clinic back after multiple missed phone calls. Notified daughter of results, daughter verbalized understanding.

## 2025-04-17 DIAGNOSIS — F41.9 ANXIETY: ICD-10-CM

## 2025-04-17 DIAGNOSIS — G25.81 RESTLESS LEG: ICD-10-CM

## 2025-04-17 DIAGNOSIS — J44.9 CHRONIC OBSTRUCTIVE PULMONARY DISEASE, UNSPECIFIED COPD TYPE: ICD-10-CM

## 2025-04-17 DIAGNOSIS — I10 PRIMARY HYPERTENSION: ICD-10-CM

## 2025-04-17 DIAGNOSIS — K21.9 GASTROESOPHAGEAL REFLUX DISEASE WITHOUT ESOPHAGITIS: ICD-10-CM

## 2025-04-17 DIAGNOSIS — J30.9 ALLERGIC RHINITIS, UNSPECIFIED SEASONALITY, UNSPECIFIED TRIGGER: ICD-10-CM

## 2025-04-17 DIAGNOSIS — J01.40 ACUTE NON-RECURRENT PANSINUSITIS: ICD-10-CM

## 2025-04-17 DIAGNOSIS — E78.2 MIXED HYPERLIPIDEMIA: ICD-10-CM

## 2025-04-17 DIAGNOSIS — I63.89 CEREBROVASCULAR ACCIDENT (CVA) DUE TO OTHER MECHANISM: ICD-10-CM

## 2025-04-17 DIAGNOSIS — F33.1 MODERATE EPISODE OF RECURRENT MAJOR DEPRESSIVE DISORDER: ICD-10-CM

## 2025-04-17 DIAGNOSIS — Z91.09 ENVIRONMENTAL ALLERGIES: ICD-10-CM

## 2025-04-17 RX ORDER — LORATADINE 10 MG/1
10 TABLET ORAL DAILY
Qty: 90 TABLET | Refills: 1 | Status: SHIPPED | OUTPATIENT
Start: 2025-04-17 | End: 2026-04-17

## 2025-04-17 RX ORDER — FLUTICASONE PROPIONATE 50 MCG
2 SPRAY, SUSPENSION (ML) NASAL DAILY
Qty: 16 G | Refills: 5 | Status: SHIPPED | OUTPATIENT
Start: 2025-04-17

## 2025-04-17 RX ORDER — FLUTICASONE FUROATE, UMECLIDINIUM BROMIDE AND VILANTEROL TRIFENATATE 100; 62.5; 25 UG/1; UG/1; UG/1
1 POWDER RESPIRATORY (INHALATION) DAILY
Qty: 60 EACH | Refills: 5 | Status: SHIPPED | OUTPATIENT
Start: 2025-04-17

## 2025-04-17 RX ORDER — ROSUVASTATIN CALCIUM 40 MG/1
40 TABLET, COATED ORAL NIGHTLY
Qty: 90 TABLET | Refills: 1 | Status: SHIPPED | OUTPATIENT
Start: 2025-04-17

## 2025-04-17 RX ORDER — AMLODIPINE BESYLATE 5 MG/1
5 TABLET ORAL DAILY
Qty: 90 TABLET | Refills: 1 | Status: SHIPPED | OUTPATIENT
Start: 2025-04-17

## 2025-04-17 RX ORDER — LISINOPRIL 40 MG/1
40 TABLET ORAL DAILY
Qty: 90 TABLET | Refills: 1 | Status: SHIPPED | OUTPATIENT
Start: 2025-04-17

## 2025-04-17 RX ORDER — ROPINIROLE 0.25 MG/1
0.25 TABLET, FILM COATED ORAL 3 TIMES DAILY
Qty: 270 TABLET | Refills: 1 | Status: SHIPPED | OUTPATIENT
Start: 2025-04-17

## 2025-04-17 RX ORDER — NAPROXEN SODIUM 220 MG/1
81 TABLET, FILM COATED ORAL DAILY
Qty: 90 TABLET | Refills: 1 | Status: SHIPPED | OUTPATIENT
Start: 2025-04-17 | End: 2026-04-17

## 2025-04-17 RX ORDER — BUSPIRONE HYDROCHLORIDE 10 MG/1
10 TABLET ORAL 3 TIMES DAILY
Qty: 90 TABLET | Refills: 5 | Status: SHIPPED | OUTPATIENT
Start: 2025-04-17

## 2025-04-17 RX ORDER — ESCITALOPRAM OXALATE 20 MG/1
20 TABLET ORAL DAILY
Qty: 90 TABLET | Refills: 1 | Status: SHIPPED | OUTPATIENT
Start: 2025-04-17

## 2025-04-17 RX ORDER — GABAPENTIN 100 MG/1
100 CAPSULE ORAL NIGHTLY
Qty: 90 CAPSULE | Refills: 1 | Status: SHIPPED | OUTPATIENT
Start: 2025-04-17

## 2025-04-17 RX ORDER — MONTELUKAST SODIUM 10 MG/1
10 TABLET ORAL DAILY
Qty: 90 TABLET | Refills: 1 | Status: SHIPPED | OUTPATIENT
Start: 2025-04-17

## 2025-04-17 RX ORDER — PANTOPRAZOLE SODIUM 40 MG/1
40 TABLET, DELAYED RELEASE ORAL DAILY
Qty: 90 TABLET | Refills: 1 | Status: SHIPPED | OUTPATIENT
Start: 2025-04-17

## 2025-04-17 RX ORDER — METOPROLOL TARTRATE 25 MG/1
12.5 TABLET, FILM COATED ORAL 2 TIMES DAILY
Qty: 90 TABLET | Refills: 1 | Status: SHIPPED | OUTPATIENT
Start: 2025-04-17

## 2025-04-17 RX ORDER — CLOPIDOGREL BISULFATE 75 MG/1
75 TABLET ORAL DAILY
Qty: 90 TABLET | Refills: 1 | Status: SHIPPED | OUTPATIENT
Start: 2025-04-17

## 2025-04-17 RX ORDER — ISOSORBIDE MONONITRATE 30 MG/1
30 TABLET, EXTENDED RELEASE ORAL DAILY
Qty: 90 TABLET | Refills: 1 | Status: SHIPPED | OUTPATIENT
Start: 2025-04-17

## 2025-04-17 RX ORDER — ALBUTEROL SULFATE 90 UG/1
2 INHALANT RESPIRATORY (INHALATION) EVERY 6 HOURS PRN
Qty: 18 G | Refills: 5 | Status: SHIPPED | OUTPATIENT
Start: 2025-04-17

## 2025-04-17 NOTE — TELEPHONE ENCOUNTER
----- Message from Med Assistant Breanna sent at 4/17/2025  4:01 PM CDT -----  Please send all pt's meds to [facility].  She says [facility] but they keep telling her that they do not have the medicine.  Pt says she just wants to switch to [facility] pharmacy because she has not had her meds.

## 2025-06-06 ENCOUNTER — OFFICE VISIT (OUTPATIENT)
Dept: FAMILY MEDICINE | Facility: CLINIC | Age: 80
End: 2025-06-06
Payer: MEDICARE

## 2025-06-06 VITALS
OXYGEN SATURATION: 94 % | TEMPERATURE: 99 F | BODY MASS INDEX: 37.35 KG/M2 | SYSTOLIC BLOOD PRESSURE: 130 MMHG | WEIGHT: 238 LBS | DIASTOLIC BLOOD PRESSURE: 64 MMHG | HEART RATE: 53 BPM | HEIGHT: 67 IN

## 2025-06-06 DIAGNOSIS — R10.9 ABDOMINAL PAIN, UNSPECIFIED ABDOMINAL LOCATION: ICD-10-CM

## 2025-06-06 DIAGNOSIS — I10 PRIMARY HYPERTENSION: Primary | ICD-10-CM

## 2025-06-06 DIAGNOSIS — E66.01 SEVERE OBESITY (BMI 35.0-39.9) WITH COMORBIDITY: ICD-10-CM

## 2025-06-06 DIAGNOSIS — G89.29 CHRONIC PAIN OF BOTH KNEES: ICD-10-CM

## 2025-06-06 DIAGNOSIS — M54.42 CHRONIC MIDLINE LOW BACK PAIN WITH BILATERAL SCIATICA: ICD-10-CM

## 2025-06-06 DIAGNOSIS — M25.561 CHRONIC PAIN OF BOTH KNEES: ICD-10-CM

## 2025-06-06 DIAGNOSIS — M25.562 CHRONIC PAIN OF BOTH KNEES: ICD-10-CM

## 2025-06-06 DIAGNOSIS — E78.5 HYPERLIPIDEMIA, UNSPECIFIED HYPERLIPIDEMIA TYPE: ICD-10-CM

## 2025-06-06 DIAGNOSIS — M25.552 PAIN OF BOTH HIP JOINTS: ICD-10-CM

## 2025-06-06 DIAGNOSIS — R53.83 FATIGUE, UNSPECIFIED TYPE: ICD-10-CM

## 2025-06-06 DIAGNOSIS — F33.1 MODERATE EPISODE OF RECURRENT MAJOR DEPRESSIVE DISORDER: ICD-10-CM

## 2025-06-06 DIAGNOSIS — G89.29 CHRONIC MIDLINE LOW BACK PAIN WITH BILATERAL SCIATICA: ICD-10-CM

## 2025-06-06 DIAGNOSIS — M54.41 CHRONIC MIDLINE LOW BACK PAIN WITH BILATERAL SCIATICA: ICD-10-CM

## 2025-06-06 DIAGNOSIS — M25.551 PAIN OF BOTH HIP JOINTS: ICD-10-CM

## 2025-06-06 LAB
25(OH)D3 SERPL-MCNC: 14.1 NG/ML (ref 30–80)
ALBUMIN SERPL BCP-MCNC: 3.7 G/DL (ref 3.4–4.8)
ALBUMIN/GLOB SERPL: 1.3 {RATIO}
ALP SERPL-CCNC: 77 U/L (ref 40–150)
ALT SERPL W P-5'-P-CCNC: 12 U/L
AMYLASE SERPL-CCNC: 39 U/L (ref 20–160)
ANION GAP SERPL CALCULATED.3IONS-SCNC: 8 MMOL/L (ref 7–16)
AST SERPL W P-5'-P-CCNC: 14 U/L (ref 11–45)
BASOPHILS # BLD AUTO: 0.09 K/UL (ref 0–0.2)
BASOPHILS NFR BLD AUTO: 1.1 % (ref 0–1)
BILIRUB SERPL-MCNC: 0.3 MG/DL
BUN SERPL-MCNC: 26 MG/DL (ref 10–20)
BUN/CREAT SERPL: 22 (ref 6–20)
CALCIUM SERPL-MCNC: 10.2 MG/DL (ref 8.4–10.2)
CHLORIDE SERPL-SCNC: 105 MMOL/L (ref 98–107)
CHOLEST SERPL-MCNC: 155 MG/DL
CHOLEST/HDLC SERPL: 3.5 {RATIO}
CO2 SERPL-SCNC: 32 MMOL/L (ref 23–31)
CREAT SERPL-MCNC: 1.17 MG/DL (ref 0.55–1.02)
DIFFERENTIAL METHOD BLD: ABNORMAL
EGFR (NO RACE VARIABLE) (RUSH/TITUS): 48 ML/MIN/1.73M2
EOSINOPHIL # BLD AUTO: 0.2 K/UL (ref 0–0.5)
EOSINOPHIL NFR BLD AUTO: 2.5 % (ref 1–4)
ERYTHROCYTE [DISTWIDTH] IN BLOOD BY AUTOMATED COUNT: 13.3 % (ref 11.5–14.5)
FOLATE SERPL-MCNC: 7.4 NG/ML (ref 7–31.4)
GLOBULIN SER-MCNC: 2.8 G/DL (ref 2–4)
GLUCOSE SERPL-MCNC: 116 MG/DL (ref 82–115)
HCT VFR BLD AUTO: 41 % (ref 38–47)
HCYS SERPL-SCNC: 10.5 ΜMOL/L (ref 5.1–15.4)
HDLC SERPL-MCNC: 44 MG/DL (ref 35–60)
HGB BLD-MCNC: 12.1 G/DL (ref 12–16)
IMM GRANULOCYTES # BLD AUTO: 0.03 K/UL (ref 0–0.04)
IMM GRANULOCYTES NFR BLD: 0.4 % (ref 0–0.4)
LDLC SERPL CALC-MCNC: 77 MG/DL
LDLC/HDLC SERPL: 1.8 {RATIO}
LIPASE SERPL-CCNC: 42 U/L
LYMPHOCYTES # BLD AUTO: 1.92 K/UL (ref 1–4.8)
LYMPHOCYTES NFR BLD AUTO: 24.3 % (ref 27–41)
MCH RBC QN AUTO: 29.4 PG (ref 27–31)
MCHC RBC AUTO-ENTMCNC: 29.5 G/DL (ref 32–36)
MCV RBC AUTO: 99.5 FL (ref 80–96)
MONOCYTES # BLD AUTO: 0.63 K/UL (ref 0–0.8)
MONOCYTES NFR BLD AUTO: 8 % (ref 2–6)
MPC BLD CALC-MCNC: 10.4 FL (ref 9.4–12.4)
NEUTROPHILS # BLD AUTO: 5.02 K/UL (ref 1.8–7.7)
NEUTROPHILS NFR BLD AUTO: 63.7 % (ref 53–65)
NONHDLC SERPL-MCNC: 111 MG/DL
NRBC # BLD AUTO: 0 X10E3/UL
NRBC, AUTO (.00): 0 %
PLATELET # BLD AUTO: 218 K/UL (ref 150–400)
POTASSIUM SERPL-SCNC: 5 MMOL/L (ref 3.5–5.1)
PROT SERPL-MCNC: 6.5 G/DL (ref 5.8–7.6)
RBC # BLD AUTO: 4.12 M/UL (ref 4.2–5.4)
SODIUM SERPL-SCNC: 140 MMOL/L (ref 136–145)
T4 FREE SERPL-MCNC: 0.84 NG/DL (ref 0.7–1.48)
TRIGL SERPL-MCNC: 172 MG/DL (ref 37–140)
TSH SERPL DL<=0.005 MIU/L-ACNC: 2.72 UIU/ML (ref 0.35–4.94)
VIT B12 SERPL-MCNC: 247 PG/ML (ref 213–816)
VLDLC SERPL-MCNC: 34 MG/DL
WBC # BLD AUTO: 7.89 K/UL (ref 4.5–11)

## 2025-06-06 RX ORDER — DULOXETIN HYDROCHLORIDE 30 MG/1
30 CAPSULE, DELAYED RELEASE ORAL DAILY
Qty: 30 CAPSULE | Refills: 1 | Status: SHIPPED | OUTPATIENT
Start: 2025-06-06 | End: 2026-06-06

## 2025-06-09 ENCOUNTER — RESULTS FOLLOW-UP (OUTPATIENT)
Dept: FAMILY MEDICINE | Facility: CLINIC | Age: 80
End: 2025-06-09

## 2025-06-17 ENCOUNTER — TELEPHONE (OUTPATIENT)
Dept: FAMILY MEDICINE | Facility: CLINIC | Age: 80
End: 2025-06-17
Payer: MEDICARE

## 2025-06-17 NOTE — TELEPHONE ENCOUNTER
Attempted to notify patient of results. Patient's friend, Kim, answered the phone. Asked Ms Alvarez if she could have patient call clinic back to discuss lab results. Ms Alvarez verbalized understanding and stated she would let the patient know.

## 2025-06-17 NOTE — TELEPHONE ENCOUNTER
----- Message from Pa Bunch MD sent at 6/9/2025  8:09 AM CDT -----  Her vitamin-D is low.  Start 61691 units weekly x8 weeks and then recheck.  Her triglycerides and LDL are mildly elevated so improved diet by decreasing intake of fatty foods and high carb content   foods she has mild chronic renal impairment that is stable and needs to avoid NSAIDs and stay well hydrated.  Her CO2 is mildly elevated so the patient is not on oxygen at night she may need an   overnight O2.  She would likely benefit from a sleep eval.  Thyroid studies are normal with a normal B12 level although low normal.  She will benefit from over-the-counter B vitamins pancreas   studies.  ----- Message -----  From: Lab, Background User  Sent: 6/6/2025   7:32 PM CDT  To: Pa Bunch MD

## 2025-06-19 DIAGNOSIS — E55.9 VITAMIN D DEFICIENCY: Primary | ICD-10-CM

## 2025-06-19 RX ORDER — ERGOCALCIFEROL 1.25 MG/1
50000 CAPSULE ORAL
Qty: 4 CAPSULE | Refills: 1 | Status: SHIPPED | OUTPATIENT
Start: 2025-06-19

## 2025-06-19 NOTE — TELEPHONE ENCOUNTER
Pt notified that her vit d is low and Dr Bunch advised vit d 50.000 units weekly for eight weeks and the repeat fasting vit d level. She was also notified that her trigs and ldl were mildly elevated and that Dr Bunch advised for her to improve her diet. Notified her that she has mild chronic renal impairment that is stable and that she should avoid NSAIDs and increase water intake, she voiced understanding. She does report that she uses a cpap nightly and when she takes nap. Advised for her to schedule a follow up with Dr Ortega, she agreed. Notified her that thyroid studies are normal and that her b12 level is low but still WNL, she voiced understanding and agreed to supplememntal B vitamins and she was also made aware that her pancreatic labs were WNL she voiced understanding. Will send Vit to Dr Bunch.

## 2025-07-02 ENCOUNTER — TELEPHONE (OUTPATIENT)
Dept: FAMILY MEDICINE | Facility: CLINIC | Age: 80
End: 2025-07-02
Payer: MEDICARE

## 2025-07-02 NOTE — TELEPHONE ENCOUNTER
Pt daughter contacted. She requesting the appointment date and time for pain treatment, notified her that a referral has not been placed to pain treatment at this time. She reports that she must have misunderstood at Ms. Romero last appointment. Notified her that Dr Bunch could be consulted regarding a referral being placed. She voiced understanding and agreed. She requested to have the patient take one extra requip daily due to severe leg pain. Notified her that Dr Bunch would also have to be consulted regarding this, she voiced understanding.    Dr Bunch consulted, xrays that he has ordered have not been completed therefor there is no dx code for the pain tx referral. He agreed for patient to take one extra requip daily until her follow up on 7/11/25. Voiced understanding.     Patients daughter notified of Dr Bunch's recommendations, she voiced understanding. Reports she will try to get patient to hospital for xrays but she is having a difficult time ambulating, voiced understanding and notified her that a referral to pain tx could not be placed until the images were done. She voiced understanding and reports she will have patient taken to imaging center for xrays, voiced understanding.

## 2025-07-02 NOTE — TELEPHONE ENCOUNTER
----- Message from Pauline sent at 7/2/2025  1:21 PM CDT -----  Regarding: Nurse call back  Contact: Kalina Martin needs: a nurse call back.        Phone #: [phone]-Kalina

## 2025-07-10 ENCOUNTER — HOSPITAL ENCOUNTER (OUTPATIENT)
Dept: RADIOLOGY | Facility: HOSPITAL | Age: 80
Discharge: HOME | End: 2025-07-10
Attending: FAMILY MEDICINE
Payer: MEDICARE

## 2025-07-10 PROCEDURE — 73560 X-RAY EXAM OF KNEE 1 OR 2: CPT | Mod: 26,50,, | Performed by: RADIOLOGY

## 2025-07-10 PROCEDURE — 73560 X-RAY EXAM OF KNEE 1 OR 2: CPT | Mod: TC,50

## 2025-07-10 PROCEDURE — 73522 X-RAY EXAM HIPS BI 3-4 VIEWS: CPT | Mod: TC

## 2025-07-10 PROCEDURE — 73522 X-RAY EXAM HIPS BI 3-4 VIEWS: CPT | Mod: 26,,, | Performed by: RADIOLOGY

## 2025-07-11 ENCOUNTER — TELEPHONE (OUTPATIENT)
Dept: FAMILY MEDICINE | Facility: CLINIC | Age: 80
End: 2025-07-11
Payer: MEDICARE

## 2025-07-11 ENCOUNTER — OFFICE VISIT (OUTPATIENT)
Dept: FAMILY MEDICINE | Facility: CLINIC | Age: 80
End: 2025-07-11
Payer: MEDICARE

## 2025-07-11 VITALS
DIASTOLIC BLOOD PRESSURE: 70 MMHG | OXYGEN SATURATION: 95 % | HEIGHT: 67 IN | RESPIRATION RATE: 15 BRPM | HEART RATE: 63 BPM | BODY MASS INDEX: 36.88 KG/M2 | SYSTOLIC BLOOD PRESSURE: 130 MMHG | TEMPERATURE: 98 F | WEIGHT: 235 LBS

## 2025-07-11 DIAGNOSIS — M16.0 OSTEOARTHRITIS OF BOTH HIPS, UNSPECIFIED OSTEOARTHRITIS TYPE: Primary | ICD-10-CM

## 2025-07-11 DIAGNOSIS — F32.A DEPRESSION, UNSPECIFIED DEPRESSION TYPE: ICD-10-CM

## 2025-07-11 DIAGNOSIS — M17.0 OSTEOARTHRITIS OF BOTH KNEES, UNSPECIFIED OSTEOARTHRITIS TYPE: ICD-10-CM

## 2025-07-11 DIAGNOSIS — F41.9 ANXIETY: ICD-10-CM

## 2025-07-11 RX ORDER — DULOXETIN HYDROCHLORIDE 60 MG/1
60 CAPSULE, DELAYED RELEASE ORAL DAILY
Qty: 90 CAPSULE | Refills: 1 | Status: SHIPPED | OUTPATIENT
Start: 2025-07-11 | End: 2026-07-11

## 2025-07-11 NOTE — PROGRESS NOTES
Livier Romero is a 80 y.o. female seen today for follow-up on her depression and anxiety.  30 mg duloxetine is not very helpful and we discussed increasing the dose to the 60 mg dose which I hope will be more effective.  The patient did have x-rays done of her knees and hip and these did confirm DJD we will place an orthopedic evaluation.  Per patient request we will also refer her for pain evaluation.  The patient does have a history of sleep apnea not currently using her CPAP at night but using her CPAP to nap during the day.  Due to her chronic insomnia I have asked the patient to discontinue napping and to use her CPAP when she sleeps at night.  We also discussed sleep hygiene.    Past Medical History:   Diagnosis Date    Adrenal adenoma     Anxiety     Asthma     Ataxic gait     Cerebral infarction     COPD (chronic obstructive pulmonary disease)     Depression     Hearing loss     Hyperlipidemia     Hypertension     Restless legs      Family History   Problem Relation Name Age of Onset    Heart disease Mother      Stroke Father      Cancer Sister      Lung cancer Maternal Grandmother       Medications Ordered Prior to Encounter[1]  Immunization History   Administered Date(s) Administered    Influenza (FLUAD) - Quadrivalent - Adjuvanted - PF *Preferred* (65+) 12/27/2023    Influenza - Trivalent - Fluad - Adjuvanted - PF (65 years and older 10/01/2024    Pneumococcal Conjugate - 13 Valent 01/09/2018    Pneumococcal Conjugate - 20 Valent 03/07/2025    Pneumococcal Polysaccharide - 23 Valent 10/03/2016       Review of Systems   Constitutional:  Positive for malaise/fatigue. Negative for fever and weight loss.   Respiratory:  Positive for shortness of breath.    Cardiovascular:  Negative for chest pain and palpitations.   Gastrointestinal:  Negative for nausea and vomiting.   Musculoskeletal:  Positive for joint pain.   Psychiatric/Behavioral:  Positive for depression. The patient is nervous/anxious and has  insomnia.         Vitals:    07/11/25 1125   BP: 130/70   Pulse:    Resp:    Temp:        Physical Exam  Vitals reviewed.   Eyes:      Conjunctiva/sclera: Conjunctivae normal.   Pulmonary:      Effort: Pulmonary effort is normal.   Neurological:      Mental Status: She is alert.   Psychiatric:         Mood and Affect: Mood normal.         Behavior: Behavior normal.         Thought Content: Thought content normal.         Judgment: Judgment normal.          Assessment and Plan  1. Osteoarthritis of both hips, unspecified osteoarthritis type  -     Ambulatory referral/consult to Orthopedics; Future; Expected date: 07/18/2025    2. Osteoarthritis of both knees, unspecified osteoarthritis type  -     Ambulatory referral/consult to Orthopedics; Future; Expected date: 07/18/2025    3. Anxiety  -     DULoxetine (CYMBALTA) 60 MG capsule; Take 1 capsule (60 mg total) by mouth once daily.  Dispense: 90 capsule; Refill: 1    4. Depression, unspecified depression type  -     DULoxetine (CYMBALTA) 60 MG capsule; Take 1 capsule (60 mg total) by mouth once daily.  Dispense: 90 capsule; Refill: 1             Return to clinic in 1 month for follow-up or as needed.    Health Maintenance Topics with due status: Not Due       Topic Last Completion Date    DEXA Scan 05/15/2024    Influenza Vaccine 10/01/2024    Lipid Panel 06/06/2025              [1]   Current Outpatient Medications on File Prior to Visit   Medication Sig Dispense Refill    albuterol (PROVENTIL/VENTOLIN HFA) 90 mcg/actuation inhaler Inhale 2 puffs into the lungs every 6 (six) hours as needed for Wheezing. Rescue 18 g 5    amLODIPine (NORVASC) 5 MG tablet Take 1 tablet (5 mg total) by mouth once daily. 90 tablet 1    aspirin 81 MG Chew Take 1 tablet (81 mg total) by mouth once daily. 90 tablet 1    busPIRone (BUSPAR) 10 MG tablet Take 1 tablet (10 mg total) by mouth 3 (three) times daily. As needed for anxiety 90 tablet 5    clopidogreL (PLAVIX) 75 mg tablet Take 1  tablet (75 mg total) by mouth once daily. 90 tablet 1    ergocalciferol (ERGOCALCIFEROL) 50,000 unit Cap Take 1 capsule (50,000 Units total) by mouth every 7 days. 4 capsule 1    fluticasone propionate (FLONASE) 50 mcg/actuation nasal spray 2 sprays (100 mcg total) by Each Nostril route once daily. 16 g 5    gabapentin (NEURONTIN) 100 MG capsule Take 1 capsule (100 mg total) by mouth every evening. 90 capsule 1    isosorbide mononitrate (IMDUR) 30 MG 24 hr tablet Take 1 tablet (30 mg total) by mouth once daily. 90 tablet 1    lisinopriL (PRINIVIL,ZESTRIL) 40 MG tablet Take 1 tablet (40 mg total) by mouth once daily. 90 tablet 1    loratadine (CLARITIN) 10 mg tablet Take 1 tablet (10 mg total) by mouth once daily. 90 tablet 1    metoprolol tartrate (LOPRESSOR) 25 MG tablet Take 0.5 tablets (12.5 mg total) by mouth 2 (two) times daily. 90 tablet 1    montelukast (SINGULAIR) 10 mg tablet Take 1 tablet (10 mg total) by mouth once daily. 90 tablet 1    pantoprazole (PROTONIX) 40 MG tablet Take 1 tablet (40 mg total) by mouth once daily. 90 tablet 1    rOPINIRole (REQUIP) 0.25 MG tablet Take 1 tablet (0.25 mg total) by mouth 3 (three) times daily. 270 tablet 1    rosuvastatin (CRESTOR) 40 MG Tab Take 1 tablet (40 mg total) by mouth every evening. 90 tablet 1    TRELEGY ELLIPTA 100-62.5-25 mcg DsDv Take 1 puff by mouth once daily. 60 each 5    [DISCONTINUED] DULoxetine (CYMBALTA) 30 MG capsule Take 1 capsule (30 mg total) by mouth once daily. 30 capsule 1     No current facility-administered medications on file prior to visit.

## 2025-07-11 NOTE — TELEPHONE ENCOUNTER
----- Message from Pa Bunch MD sent at 7/10/2025  4:35 PM CDT -----  The patient does have osteoarthritis on both knees worse on the right and I recommended evaluation by Orthopedics.  ----- Message -----  From: Interface, Rad Results In  Sent: 7/10/2025   4:28 PM CDT  To: Pa Bunch MD

## 2025-07-11 NOTE — TELEPHONE ENCOUNTER
----- Message from Pa Bunch MD sent at 7/10/2025  4:35 PM CDT -----  Patient has degenerative changes of both hips and once again as for her knees I recommend an orthopedic evaluation.  ----- Message -----  From: Interface, Rad Results In  Sent: 7/10/2025   4:28 PM CDT  To: Pa Bunch MD

## 2025-07-23 ENCOUNTER — OFFICE VISIT (OUTPATIENT)
Dept: ORTHOPEDICS | Facility: CLINIC | Age: 80
End: 2025-07-23
Payer: MEDICARE

## 2025-07-23 VITALS
HEIGHT: 67 IN | OXYGEN SATURATION: 96 % | DIASTOLIC BLOOD PRESSURE: 66 MMHG | WEIGHT: 235 LBS | HEART RATE: 67 BPM | BODY MASS INDEX: 36.88 KG/M2 | SYSTOLIC BLOOD PRESSURE: 135 MMHG

## 2025-07-23 DIAGNOSIS — M70.62 TROCHANTERIC BURSITIS OF BOTH HIPS: ICD-10-CM

## 2025-07-23 DIAGNOSIS — M16.0 OSTEOARTHRITIS OF BOTH HIPS, UNSPECIFIED OSTEOARTHRITIS TYPE: ICD-10-CM

## 2025-07-23 DIAGNOSIS — M17.0 OSTEOARTHRITIS OF BOTH KNEES, UNSPECIFIED OSTEOARTHRITIS TYPE: ICD-10-CM

## 2025-07-23 DIAGNOSIS — M53.3 SI (SACROILIAC) PAIN: Primary | ICD-10-CM

## 2025-07-23 DIAGNOSIS — M70.61 TROCHANTERIC BURSITIS OF BOTH HIPS: ICD-10-CM

## 2025-07-23 PROCEDURE — 99999 PR PBB SHADOW E&M-EST. PATIENT-LVL V: CPT | Mod: PBBFAC,,, | Performed by: ORTHOPAEDIC SURGERY

## 2025-07-23 PROCEDURE — 99215 OFFICE O/P EST HI 40 MIN: CPT | Mod: PBBFAC | Performed by: ORTHOPAEDIC SURGERY

## 2025-07-23 PROCEDURE — 99203 OFFICE O/P NEW LOW 30 MIN: CPT | Mod: S$PBB,,, | Performed by: ORTHOPAEDIC SURGERY

## 2025-07-23 RX ORDER — CYCLOBENZAPRINE HCL 10 MG
10 TABLET ORAL 3 TIMES DAILY PRN
Qty: 30 TABLET | Refills: 0 | Status: SHIPPED | OUTPATIENT
Start: 2025-07-23 | End: 2025-08-02

## 2025-07-23 NOTE — PROGRESS NOTES
Clinic Note        CC:   Chief Complaint   Patient presents with    Left Knee - Pain    Right Knee - Pain        Principal problem: SI (sacroiliac) pain [M53.3]     REASON FOR VISIT:       HISTORY:  80-year-old female complaining of buttock pain hip pain in his mild arthritic changes of the hips.  Had previous trauma to her ankle.      PAST MEDICAL HISTORY:   Past Medical History:   Diagnosis Date    Adrenal adenoma     Anxiety     Asthma     Ataxic gait     Cerebral infarction     COPD (chronic obstructive pulmonary disease)     Depression     Hearing loss     Hyperlipidemia     Hypertension     Restless legs           PAST SURGICAL HISTORY:   Past Surgical History:   Procedure Laterality Date    HYSTERECTOMY            ALLERGIES:   Review of patient's allergies indicates:   Allergen Reactions    Cisplatin Anaphylaxis    Levofloxacin Other (See Comments)     bronchospasm    Shellfish containing products     Macrobid [nitrofurantoin monohyd/m-cryst] Nausea And Vomiting        MEDICATIONS :  Current Medications[1]     SOCIAL HISTORY: Social History[2]       FAMILY HISTORY:   Family History   Problem Relation Name Age of Onset    Heart disease Mother      Stroke Father      Cancer Sister      Lung cancer Maternal Grandmother            PHYSICAL EXAM:  In general, this is a well-developed, well-nourished female . The patient is alert, oriented and cooperative.      HEENT:  Normocephalic, atraumatic.  Extraocular movements are intact bilaterally.  The oropharynx is benign.       NECK:  Nontender with good range of motion.      LUNGS:  Clear to auscultation bilaterally.      HEART:  Demonstrates a regular rate and rhythm.  No murmurs appreciated.      ABDOMEN:  Soft, non-tender, non-distended.        EXTREMITIES:  Bilateral lower extremity she has motor function 5/5 sensation to touch has palpable pulses she has pain over her SI joint bilaterally some pain over the greater trochanter.  On left ankle she has  tenderness over her posterior tibial tendon.  She has some tenderness over the medial joint lines in both knees.  No instability in either knee noted.      RADIOGRAPHIC FINDINGS:  X-rays show some mild degenerative changes of the hips and knees.      IMPRESSION: SI (sacroiliac) pain    Osteoarthritis of both hips, unspecified osteoarthritis type  -     Ambulatory referral/consult to Orthopedics    Osteoarthritis of both knees, unspecified osteoarthritis type  -     Ambulatory referral/consult to Orthopedics    Trochanteric bursitis of both hips    Other orders  -     cyclobenzaprine (FLEXERIL) 10 MG tablet; Take 1 tablet (10 mg total) by mouth 3 (three) times daily as needed.  Dispense: 30 tablet; Refill: 0         PLAN:  This time she has some in his eye joint pain that has some low back pain in his well as troch bursitis she also has some posterior tibial tendinitis on left with the arthritic changes knee.  I am going to send her to therapy for strengthening and modalities of the ankle in his hips.  Wrote her for some Flexeril for back.  Follow back up in 3 months.  There are no Patient Instructions on file for this visit.      No follow-ups on file.         Ambrose Vides      (Subject to voice recognition error, transcription service not allowed)           [1]   Current Outpatient Medications:     albuterol (PROVENTIL/VENTOLIN HFA) 90 mcg/actuation inhaler, Inhale 2 puffs into the lungs every 6 (six) hours as needed for Wheezing. Rescue, Disp: 18 g, Rfl: 5    amLODIPine (NORVASC) 5 MG tablet, Take 1 tablet (5 mg total) by mouth once daily., Disp: 90 tablet, Rfl: 1    aspirin 81 MG Chew, Take 1 tablet (81 mg total) by mouth once daily., Disp: 90 tablet, Rfl: 1    busPIRone (BUSPAR) 10 MG tablet, Take 1 tablet (10 mg total) by mouth 3 (three) times daily. As needed for anxiety, Disp: 90 tablet, Rfl: 5    clopidogreL (PLAVIX) 75 mg tablet, Take 1 tablet (75 mg total) by mouth once daily., Disp: 90 tablet, Rfl: 1     DULoxetine (CYMBALTA) 60 MG capsule, Take 1 capsule (60 mg total) by mouth once daily., Disp: 90 capsule, Rfl: 1    ergocalciferol (ERGOCALCIFEROL) 50,000 unit Cap, Take 1 capsule (50,000 Units total) by mouth every 7 days., Disp: 4 capsule, Rfl: 1    fluticasone propionate (FLONASE) 50 mcg/actuation nasal spray, 2 sprays (100 mcg total) by Each Nostril route once daily., Disp: 16 g, Rfl: 5    gabapentin (NEURONTIN) 100 MG capsule, Take 1 capsule (100 mg total) by mouth every evening., Disp: 90 capsule, Rfl: 1    isosorbide mononitrate (IMDUR) 30 MG 24 hr tablet, Take 1 tablet (30 mg total) by mouth once daily., Disp: 90 tablet, Rfl: 1    lisinopriL (PRINIVIL,ZESTRIL) 40 MG tablet, Take 1 tablet (40 mg total) by mouth once daily., Disp: 90 tablet, Rfl: 1    loratadine (CLARITIN) 10 mg tablet, Take 1 tablet (10 mg total) by mouth once daily., Disp: 90 tablet, Rfl: 1    metoprolol tartrate (LOPRESSOR) 25 MG tablet, Take 0.5 tablets (12.5 mg total) by mouth 2 (two) times daily., Disp: 90 tablet, Rfl: 1    montelukast (SINGULAIR) 10 mg tablet, Take 1 tablet (10 mg total) by mouth once daily., Disp: 90 tablet, Rfl: 1    pantoprazole (PROTONIX) 40 MG tablet, Take 1 tablet (40 mg total) by mouth once daily., Disp: 90 tablet, Rfl: 1    rOPINIRole (REQUIP) 0.25 MG tablet, Take 1 tablet (0.25 mg total) by mouth 3 (three) times daily., Disp: 270 tablet, Rfl: 1    rosuvastatin (CRESTOR) 40 MG Tab, Take 1 tablet (40 mg total) by mouth every evening., Disp: 90 tablet, Rfl: 1    TRELEGY ELLIPTA 100-62.5-25 mcg DsDv, Take 1 puff by mouth once daily., Disp: 60 each, Rfl: 5    cyclobenzaprine (FLEXERIL) 10 MG tablet, Take 1 tablet (10 mg total) by mouth 3 (three) times daily as needed., Disp: 30 tablet, Rfl: 0  [2]   Social History  Socioeconomic History    Marital status: Unknown   Tobacco Use    Smoking status: Former     Current packs/day: 0.00     Average packs/day: 1 pack/day for 41.0 years (41.0 ttl pk-yrs)     Types:  Cigarettes     Start date:      Quit date:      Years since quittin.5     Passive exposure: Current    Smokeless tobacco: Never   Substance and Sexual Activity    Alcohol use: Not Currently    Drug use: Never    Sexual activity: Not Currently   Social History Narrative    ** Merged History Encounter **

## 2025-08-11 ENCOUNTER — OFFICE VISIT (OUTPATIENT)
Dept: PAIN MEDICINE | Facility: CLINIC | Age: 80
End: 2025-08-11
Payer: MEDICARE

## 2025-08-11 VITALS
HEART RATE: 96 BPM | WEIGHT: 237 LBS | HEIGHT: 67 IN | DIASTOLIC BLOOD PRESSURE: 79 MMHG | RESPIRATION RATE: 18 BRPM | SYSTOLIC BLOOD PRESSURE: 139 MMHG | BODY MASS INDEX: 37.2 KG/M2

## 2025-08-11 DIAGNOSIS — M46.1 BILATERAL SACROILIITIS: ICD-10-CM

## 2025-08-11 DIAGNOSIS — M16.0 OSTEOARTHRITIS OF BOTH HIPS, UNSPECIFIED OSTEOARTHRITIS TYPE: ICD-10-CM

## 2025-08-11 DIAGNOSIS — Z79.899 ENCOUNTER FOR LONG-TERM (CURRENT) USE OF MEDICATIONS: Primary | ICD-10-CM

## 2025-08-11 DIAGNOSIS — M17.0 OSTEOARTHRITIS OF BOTH KNEES, UNSPECIFIED OSTEOARTHRITIS TYPE: ICD-10-CM

## 2025-08-11 LAB
CTP QC/QA: YES
POC (AMP) AMPHETAMINE: NEGATIVE
POC (BAR) BARBITURATES: NEGATIVE
POC (BUP) BUPRENORPHINE: NEGATIVE
POC (BZO) BENZODIAZEPINES: NEGATIVE
POC (COC) COCAINE: NEGATIVE
POC (MDMA) METHYLENEDIOXYMETHAMPHETAMINE 3,4: NEGATIVE
POC (MET) METHAMPHETAMINE: NEGATIVE
POC (MOP) OPIATES: NEGATIVE
POC (MTD) METHADONE: NEGATIVE
POC (OXY) OXYCODONE: NEGATIVE
POC (PCP) PHENCYCLIDINE: NEGATIVE
POC (TCA) TRICYCLIC ANTIDEPRESSANTS: NEGATIVE
POC TEMPERATURE (URINE): 90
POC THC: NEGATIVE

## 2025-08-11 PROCEDURE — 99215 OFFICE O/P EST HI 40 MIN: CPT | Mod: PBBFAC | Performed by: PAIN MEDICINE

## 2025-08-11 PROCEDURE — 99999 PR PBB SHADOW E&M-EST. PATIENT-LVL V: CPT | Mod: PBBFAC,,, | Performed by: PAIN MEDICINE

## 2025-08-11 PROCEDURE — 80305 DRUG TEST PRSMV DIR OPT OBS: CPT | Mod: PBBFAC | Performed by: PAIN MEDICINE

## 2025-08-11 PROCEDURE — 99204 OFFICE O/P NEW MOD 45 MIN: CPT | Mod: S$PBB,,, | Performed by: PAIN MEDICINE

## 2025-08-11 PROCEDURE — 99999PBSHW POCT URINE DRUG SCREEN PRESUMP: Mod: PBBFAC,,,

## 2025-08-11 RX ORDER — CYCLOBENZAPRINE HCL 10 MG
10 TABLET ORAL
COMMUNITY
Start: 2025-08-06

## 2025-08-11 RX ORDER — ACETAMINOPHEN AND CODEINE PHOSPHATE 300; 30 MG/1; MG/1
1 TABLET ORAL EVERY 12 HOURS PRN
Qty: 30 TABLET | Refills: 0 | Status: SHIPPED | OUTPATIENT
Start: 2025-08-11 | End: 2025-08-26

## 2025-08-13 ENCOUNTER — OFFICE VISIT (OUTPATIENT)
Dept: FAMILY MEDICINE | Facility: CLINIC | Age: 80
End: 2025-08-13
Payer: MEDICARE

## 2025-08-13 VITALS
HEIGHT: 67 IN | WEIGHT: 236 LBS | SYSTOLIC BLOOD PRESSURE: 122 MMHG | BODY MASS INDEX: 37.04 KG/M2 | OXYGEN SATURATION: 95 % | DIASTOLIC BLOOD PRESSURE: 64 MMHG | TEMPERATURE: 98 F | HEART RATE: 87 BPM | RESPIRATION RATE: 19 BRPM

## 2025-08-13 DIAGNOSIS — M16.0 OSTEOARTHRITIS OF BOTH HIPS, UNSPECIFIED OSTEOARTHRITIS TYPE: ICD-10-CM

## 2025-08-13 DIAGNOSIS — J44.9 CHRONIC OBSTRUCTIVE PULMONARY DISEASE, UNSPECIFIED COPD TYPE: Primary | ICD-10-CM

## 2025-08-13 PROCEDURE — 99212 OFFICE O/P EST SF 10 MIN: CPT | Mod: ,,, | Performed by: FAMILY MEDICINE

## 2025-08-16 LAB
1OH-MIDAZOLAM UR QL SCN: NOT DETECTED NG/ML
2-OH-ETHYLFLURAZ UR QL SCN: NOT DETECTED NG/ML
6MAM UR QL SCN: NOT DETECTED NG/ML
7AMINOCLONAZEPAM UR QL SCN: NOT DETECTED NG/ML
7AMINOFLUNITRAZEPAM UR QL SCN: NOT DETECTED NG/ML
A-OH ALPRAZ GLUC UR QL SCN: NOT DETECTED NG/ML
A-OH ALPRAZ UR QL SCN: NOT DETECTED NG/ML
A-OH-TRIAZOLAM UR QL SCN: NOT DETECTED NG/ML
ALPRAZ UR QL SCN: NOT DETECTED NG/ML
AMPHET UR QL SCN: NOT DETECTED NG/ML
ANNOTATION COMMENT IMP: NORMAL
BARBITURATES UR QL SCN>200 NG/ML: NEGATIVE NG/ML
BUPRENORPHINE UR QL SCN: NOT DETECTED NG/ML
C6G UR QL SCN: NOT DETECTED NG/ML
CHLORDIAZEP UR QL SCN: NOT DETECTED NG/ML
CLOBAZAM UR QL SCN: NOT DETECTED NG/ML
CLONAZEPAM UR QL SCN: NOT DETECTED NG/ML
COCAINE UR QL SCN: NEGATIVE NG/ML
CODEINE UR QL SCN: NOT DETECTED NG/ML
CREAT UR-MCNC: 33.6 MG/DL
DHC UR QL SCN: NOT DETECTED NG/ML
DIAZEPAM UR QL SCN: NOT DETECTED NG/ML
DRUG SCREEN COMMENT UR-IMP: NORMAL
EDDP UR QL SCN: NOT DETECTED NG/ML
EPHEDRIN UR QL SCN: NOT DETECTED NG/ML
FENTANYL UR QL SCN: NOT DETECTED NG/ML
FLUNITRAZEPAM UR QL SCN: NOT DETECTED NG/ML
FLURAZEPAM UR QL SCN: NOT DETECTED NG/ML
H3G UR QL SCN: NOT DETECTED NG/ML
HYDROCODONE UR QL SCN: NOT DETECTED NG/ML
HYDROMORPHONE UR QL SCN: NOT DETECTED NG/ML
LORAZEPAM GLUCURONIDE UR QL SCN: NOT DETECTED NG/ML
LORAZEPAM UR QL SCN: NOT DETECTED NG/ML
MDA UR QL SCN: NOT DETECTED NG/ML
MDEA UR QL SCN: NOT DETECTED NG/ML
MDMA UR QL SCN: NOT DETECTED NG/ML
ME-PHENIDATE UR QL SCN: NOT DETECTED NG/ML
MEDICATIONS MEDICATION.CURRENT: NORMAL
MEPERIDINE UR QL SCN: NOT DETECTED NG/ML
METHADONE UR QL SCN: NOT DETECTED NG/ML
METHAMPHET UR QL SCN: NOT DETECTED NG/ML
MIDAZOLAM UR QL SCN: NOT DETECTED NG/ML
MORPHINE UR QL SCN: NOT DETECTED NG/ML
MORPHINE-6-GLUCURONIDE UR QL SCN: NOT DETECTED NG/ML
N3G UR QL SCN: NOT DETECTED NG/ML
NALOXONE UR QL SCN: NOT DETECTED NG/ML
NALOXONE-3G UR QL SCN: NOT DETECTED NG/ML
NORBUPRENORPHINE UR QL SCN: NOT DETECTED NG/ML
NORCLOBAZAM UR QL SCN: NOT DETECTED NG/ML
NORDIAZEPAM UR QL SCN: NOT DETECTED NG/ML
NORFENTANYL UR QL: NOT DETECTED NG/ML
NORHYDROCODONE UR QL SCN: NOT DETECTED NG/ML
NORMEPERIDINE UR QL: NOT DETECTED NG/ML
NOROXYCODONE UR QL SCN: NOT DETECTED NG/ML
NOROXYMORPHONE UR QL SCN: NOT DETECTED NG/ML
NORPROPOXYPH UR QL SCN: NOT DETECTED NG/ML
NORTAPENTADOL UR QL SCN: NOT DETECTED NG/ML
O-NORTRAMADOL UR QL: NOT DETECTED NG/ML
O3G UR QL SCN: NOT DETECTED NG/ML
OXAZEPAM GLUCURONIDE UR QL SCN: NOT DETECTED NG/ML
OXAZEPAM UR QL SCN: NOT DETECTED NG/ML
OXIDANTS UR QL: NEGATIVE
OXYCODONE UR QL SCN: NOT DETECTED NG/ML
OXYMORPHONE UR QL SCN: NOT DETECTED NG/ML
PCP UR QL SCN: NOT DETECTED NG/ML
PH UR: 6.3 [PH]
PHENTERMINE UR QL SCN: NOT DETECTED NG/ML
PPAA UR QL SCN: NOT DETECTED NG/ML
PRAZEPAM UR QL SCN: NOT DETECTED NG/ML
PROPOXYPH UR QL SCN: NOT DETECTED NG/ML
PSEUDOEPHEDRINE UR QL SCN: NOT DETECTED NG/ML
SP GR UR REFRACTOMETRY: 1
TAPEN GLUC UR QL SCN: NOT DETECTED NG/ML
TAPENTADOL UR QL SCN: NOT DETECTED NG/ML
TEMAZEPAM GLUCURONIDE UR QL SCN: NOT DETECTED NG/ML
TEMAZEPAM UR QL SCN: NOT DETECTED NG/ML
THC UR QL SCN: NEGATIVE NG/ML
TOXICOLOGIST REVIEW: NORMAL
TOXICOLOGIST REVIEW: NORMAL
TRAMADOL UR QL SCN: NOT DETECTED NG/ML
TRIAZOLAM UR QL SCN: NOT DETECTED NG/ML
ZOLPIDEM PHENYL-4-CARB UR QL SCN: NOT DETECTED NG/ML
ZOLPIDEM UR QL SCN: NOT DETECTED NG/ML

## 2025-08-27 ENCOUNTER — TELEPHONE (OUTPATIENT)
Dept: FAMILY MEDICINE | Facility: CLINIC | Age: 80
End: 2025-08-27
Payer: MEDICARE

## 2025-09-02 ENCOUNTER — TELEPHONE (OUTPATIENT)
Dept: PAIN MEDICINE | Facility: CLINIC | Age: 80
End: 2025-09-02
Payer: MEDICARE

## 2025-09-04 ENCOUNTER — TELEPHONE (OUTPATIENT)
Dept: FAMILY MEDICINE | Facility: CLINIC | Age: 80
End: 2025-09-04
Payer: MEDICARE